# Patient Record
Sex: FEMALE | Race: WHITE | NOT HISPANIC OR LATINO | Employment: OTHER | URBAN - METROPOLITAN AREA
[De-identification: names, ages, dates, MRNs, and addresses within clinical notes are randomized per-mention and may not be internally consistent; named-entity substitution may affect disease eponyms.]

---

## 2017-01-08 ENCOUNTER — APPOINTMENT (OUTPATIENT)
Dept: RADIOLOGY | Facility: MEDICAL CENTER | Age: 72
End: 2017-01-08
Attending: EMERGENCY MEDICINE
Payer: MEDICARE

## 2017-01-08 ENCOUNTER — HOSPITAL ENCOUNTER (EMERGENCY)
Facility: MEDICAL CENTER | Age: 72
End: 2017-01-08
Attending: EMERGENCY MEDICINE
Payer: MEDICARE

## 2017-01-08 VITALS
BODY MASS INDEX: 19.17 KG/M2 | RESPIRATION RATE: 16 BRPM | TEMPERATURE: 97.7 F | WEIGHT: 122.14 LBS | OXYGEN SATURATION: 96 % | HEIGHT: 67 IN | HEART RATE: 79 BPM | SYSTOLIC BLOOD PRESSURE: 148 MMHG | DIASTOLIC BLOOD PRESSURE: 76 MMHG

## 2017-01-08 DIAGNOSIS — R42 VERTIGO: ICD-10-CM

## 2017-01-08 DIAGNOSIS — R42 DIZZINESS: ICD-10-CM

## 2017-01-08 DIAGNOSIS — I72.5 BASILAR ARTERY ANEURYSM (HCC): ICD-10-CM

## 2017-01-08 LAB
ALBUMIN SERPL BCP-MCNC: 4.3 G/DL (ref 3.2–4.9)
ALBUMIN/GLOB SERPL: 1.9 G/DL
ALP SERPL-CCNC: 58 U/L (ref 30–99)
ALT SERPL-CCNC: 15 U/L (ref 2–50)
ANION GAP SERPL CALC-SCNC: 7 MMOL/L (ref 0–11.9)
APPEARANCE UR: CLEAR
AST SERPL-CCNC: 19 U/L (ref 12–45)
BASOPHILS # BLD AUTO: 1.6 % (ref 0–1.8)
BASOPHILS # BLD: 0.07 K/UL (ref 0–0.12)
BILIRUB SERPL-MCNC: 1.1 MG/DL (ref 0.1–1.5)
BILIRUB UR QL STRIP.AUTO: NEGATIVE
BUN SERPL-MCNC: 8 MG/DL (ref 8–22)
CALCIUM SERPL-MCNC: 9.3 MG/DL (ref 8.4–10.2)
CHLORIDE SERPL-SCNC: 106 MMOL/L (ref 96–112)
CO2 SERPL-SCNC: 27 MMOL/L (ref 20–33)
COLOR UR: YELLOW
CREAT SERPL-MCNC: 0.73 MG/DL (ref 0.5–1.4)
EOSINOPHIL # BLD AUTO: 0.1 K/UL (ref 0–0.51)
EOSINOPHIL NFR BLD: 2.3 % (ref 0–6.9)
ERYTHROCYTE [DISTWIDTH] IN BLOOD BY AUTOMATED COUNT: 42.5 FL (ref 35.9–50)
GFR SERPL CREATININE-BSD FRML MDRD: >60 ML/MIN/1.73 M 2
GLOBULIN SER CALC-MCNC: 2.3 G/DL (ref 1.9–3.5)
GLUCOSE SERPL-MCNC: 105 MG/DL (ref 65–99)
GLUCOSE UR STRIP.AUTO-MCNC: NEGATIVE MG/DL
HCT VFR BLD AUTO: 43.9 % (ref 37–47)
HGB BLD-MCNC: 14.6 G/DL (ref 12–16)
IMM GRANULOCYTES # BLD AUTO: 0.01 K/UL (ref 0–0.11)
IMM GRANULOCYTES NFR BLD AUTO: 0.2 % (ref 0–0.9)
KETONES UR STRIP.AUTO-MCNC: NEGATIVE MG/DL
LEUKOCYTE ESTERASE UR QL STRIP.AUTO: NEGATIVE
LYMPHOCYTES # BLD AUTO: 1.47 K/UL (ref 1–4.8)
LYMPHOCYTES NFR BLD: 33.5 % (ref 22–41)
MAGNESIUM SERPL-MCNC: 2.2 MG/DL (ref 1.5–2.5)
MCH RBC QN AUTO: 30.7 PG (ref 27–33)
MCHC RBC AUTO-ENTMCNC: 33.3 G/DL (ref 33.6–35)
MCV RBC AUTO: 92.4 FL (ref 81.4–97.8)
MICRO URNS: NORMAL
MONOCYTES # BLD AUTO: 0.53 K/UL (ref 0–0.85)
MONOCYTES NFR BLD AUTO: 12.1 % (ref 0–13.4)
NEUTROPHILS # BLD AUTO: 2.21 K/UL (ref 2–7.15)
NEUTROPHILS NFR BLD: 50.3 % (ref 44–72)
NITRITE UR QL STRIP.AUTO: NEGATIVE
NRBC # BLD AUTO: 0 K/UL
NRBC BLD AUTO-RTO: 0 /100 WBC
PH UR STRIP.AUTO: 6.5 [PH]
PHOSPHATE SERPL-MCNC: 3.7 MG/DL (ref 2.5–4.5)
PLATELET # BLD AUTO: 266 K/UL (ref 164–446)
PMV BLD AUTO: 8.7 FL (ref 9–12.9)
POTASSIUM SERPL-SCNC: 4.2 MMOL/L (ref 3.6–5.5)
PROT SERPL-MCNC: 6.6 G/DL (ref 6–8.2)
PROT UR QL STRIP: NEGATIVE MG/DL
RBC # BLD AUTO: 4.75 M/UL (ref 4.2–5.4)
RBC UR QL AUTO: NEGATIVE
SODIUM SERPL-SCNC: 140 MMOL/L (ref 135–145)
SP GR UR STRIP.AUTO: <=1.005
WBC # BLD AUTO: 4.4 K/UL (ref 4.8–10.8)

## 2017-01-08 PROCEDURE — 70498 CT ANGIOGRAPHY NECK: CPT

## 2017-01-08 PROCEDURE — 93005 ELECTROCARDIOGRAM TRACING: CPT | Performed by: EMERGENCY MEDICINE

## 2017-01-08 PROCEDURE — 99284 EMERGENCY DEPT VISIT MOD MDM: CPT

## 2017-01-08 PROCEDURE — 700117 HCHG RX CONTRAST REV CODE 255: Performed by: EMERGENCY MEDICINE

## 2017-01-08 PROCEDURE — 85025 COMPLETE CBC W/AUTO DIFF WBC: CPT

## 2017-01-08 PROCEDURE — 84100 ASSAY OF PHOSPHORUS: CPT

## 2017-01-08 PROCEDURE — 36415 COLL VENOUS BLD VENIPUNCTURE: CPT

## 2017-01-08 PROCEDURE — 70496 CT ANGIOGRAPHY HEAD: CPT

## 2017-01-08 PROCEDURE — 83735 ASSAY OF MAGNESIUM: CPT

## 2017-01-08 PROCEDURE — 81003 URINALYSIS AUTO W/O SCOPE: CPT

## 2017-01-08 PROCEDURE — 700105 HCHG RX REV CODE 258: Performed by: EMERGENCY MEDICINE

## 2017-01-08 PROCEDURE — 80053 COMPREHEN METABOLIC PANEL: CPT

## 2017-01-08 RX ORDER — CEFDINIR 300 MG/1
300 CAPSULE ORAL 2 TIMES DAILY
Qty: 10 CAP | Refills: 0 | Status: SHIPPED | OUTPATIENT
Start: 2017-01-08 | End: 2017-01-13

## 2017-01-08 RX ORDER — SODIUM CHLORIDE 9 MG/ML
1000 INJECTION, SOLUTION INTRAVENOUS ONCE
Status: COMPLETED | OUTPATIENT
Start: 2017-01-08 | End: 2017-01-08

## 2017-01-08 RX ORDER — MECLIZINE HYDROCHLORIDE 25 MG/1
25 TABLET ORAL 3 TIMES DAILY PRN
Qty: 15 TAB | Refills: 0 | Status: SHIPPED | OUTPATIENT
Start: 2017-01-08 | End: 2018-02-22

## 2017-01-08 RX ORDER — MECOBALAMIN 5000 MCG
15 TABLET,DISINTEGRATING ORAL DAILY
COMMUNITY

## 2017-01-08 RX ADMIN — SODIUM CHLORIDE 1000 ML: 9 INJECTION, SOLUTION INTRAVENOUS at 09:49

## 2017-01-08 RX ADMIN — IOHEXOL 100 ML: 350 INJECTION, SOLUTION INTRAVENOUS at 10:53

## 2017-01-08 ASSESSMENT — PAIN SCALES - GENERAL: PAINLEVEL_OUTOF10: 2

## 2017-01-08 ASSESSMENT — PAIN SCALES - WONG BAKER: WONGBAKER_NUMERICALRESPONSE: HURTS JUST A LITTLE BIT

## 2017-01-08 NOTE — ED AVS SNAPSHOT
1/8/2017          Kim Lora  [address]  [place] [address]    Dear Kim:    [facility] wants to ensure your discharge home is safe and you or your loved ones have had all your questions answered regarding your care after you leave the hospital.    You may receive a telephone call within two days of your discharge.  This call is to make certain you understand your discharge instructions as well as ensure we provided you with the best care possible during your stay with us.     The call will only last approximately 3-5 minutes and will be done by a nurse.    Once again, we want to ensure your discharge home is safe and that you have a clear understanding of any next steps in your care.  If you have any questions or concerns, please do not hesitate to contact us, we are here for you.  Thank you for choosing [facility] for your healthcare needs.    Sincerely,    Dewayne Painter    [facility]

## 2017-01-08 NOTE — ED PROVIDER NOTES
ED Provider Note    CHIEF COMPLAINT  Chief Complaint   Patient presents with   • Ear Pain     srini Lora is a 71 y.o. female who presents to the emergency department complaining of right ear pain and severe dizziness. She states that she started with right ear popping that started 5 days ago. Following that she had severe dizziness with the room slightly moving. She states the dizziness is worse in the morning when she wakes up and when she feels a popping in her ear. She denies headaches, vision changes, hearing changes, fever, upper rest or tract infection-like symptoms. Denies any loss of sensation or strength to her arms or legs, abdominal pain, back pain, chest pain, palpitations    REVIEW OF SYSTEMS  Positives as above. Pertinent negatives include recent surgery, fever, sore throat, palpitations, upper respiratory tract infection, nausea, vomiting  All other review of systems are negative    PAST MEDICAL HISTORY  Denies  FAMILY HISTORY  Noncontributory    SOCIAL HISTORY  Social History     Social History   • Marital Status:      Spouse Name: N/A   • Number of Children: N/A   • Years of Education: N/A     Social History Main Topics   • Smoking status: None   • Smokeless tobacco: None   • Alcohol Use: None   • Drug Use: None   • Sexual Activity: Not Asked     Other Topics Concern   • None     Social History Narrative       SURGICAL HISTORY  Past Surgical History   Procedure Laterality Date   • Us-needle core bx-breast panel         CURRENT MEDICATIONS  Home Medications     Reviewed by Umesh Siddiqui (Pharmacy Tech) on 01/08/17 at 0837  Med List Status: Complete    Medication Last Dose Status    lansoprazole (PREVACID) 15 MG CAPSULE DELAYED RELEASE 1/8/2017 Active                ALLERGIES  Allergies   Allergen Reactions   • Clarithromycin Swelling     Facial swelling   • Penicillin V Swelling     Facial Swelling   • Phenolphthalein Unspecified     Per patient, reaction as a child.  "      PHYSICAL EXAM  VITAL SIGNS: /76 mmHg  Pulse 79  Temp(Src) 36.5 °C (97.7 °F)  Resp 16  Ht 1.702 m (5' 7\")  Wt 55.4 kg (122 lb 2.2 oz)  BMI 19.12 kg/m2  SpO2 96%     Constitutional: Well developed, Well nourished, No acute distress, Non-toxic appearance.   Eyes: PERRLA, EOMI, Conjunctiva normal, No discharge.   HENT: Retracted right tympanic membrane, slightly erythematous, left tympanic membrane is normal, no pharyngeal erythema or edema  Cardiovascular: Normal heart rate, Normal rhythm, No murmurs, No rubs, No gallops, and intact distal pulses.   Thorax & Lungs:  No respiratory distress, no rales, no rhonchi, No wheezing, No chest wall tenderness.   Abdomen: Bowel sounds normal, Soft, No tenderness, No guarding, No rebound, No pulsatile masses.   Skin: Warm, Dry, No erythema, No rash.   Extremities: Full range of motion, no deformity, no edema.  Neurologic:  Alert & oriented to month and age, Normal cognition, Cranial nerves II-XII are intact, No slurred speech, Negative finger to nose bilaterally, No pronator drift bilaterally,   strength 5/5 bilaterally, Leg raise strength 5/5 bilaterally, Plantarflexion strength 5/5 bilaterally, Dorsiflexion strength 5/5 bilaterally, Deep tendon reflexes 2/4 upper and lower extremities bilaterally, Sensation intact throughout, No Nystagmus. Positive Shasta-Hallpike maneuver to the right  Psychiatric: Affect normal for clinical presentation.    EKG  EKG Interpretation    Interpreted by me    Rhythm: normal sinus   Rate: 64  Axis: normal  Ectopy: none  Conduction: RSR' V1 and V2, low voltage throughout  ST Segments: no acute change  T Waves: no acute change  Q Waves: none  No old EKGs for comparison    Clinical Impression: no acute changes and normal EKG  RADIOLOGY/PROCEDURES  CT-CTA NECK WITH & W/O-POST PROCESSING   Final Result      1.  Mild, less than 50%, atherosclerotic narrowing of the carotid bulbs and proximal internal carotid arteries bilaterally, " slightly more apparent on the LEFT.   2.  No evidence for focal high-grade stenosis, dissection or occlusion of the cervical carotid or vertebral arteries.      CT-CTA HEAD WITH & W/O-POST PROCESS   Final Result      1.  Small caliber basilar artery.  Prominence of the distal basilar artery concerning for 2 mm aneurysm.   2.  No abrupt vessel cut off to indicate occlusion.   3.  Persistent carotid origin of the posterior cerebral arteries, normal variant.        Results for orders placed or performed during the hospital encounter of 01/08/17   CBC WITH DIFFERENTIAL   Result Value Ref Range    WBC 4.4 (L) 4.8 - 10.8 K/uL    RBC 4.75 4.20 - 5.40 M/uL    Hemoglobin 14.6 12.0 - 16.0 g/dL    Hematocrit 43.9 37.0 - 47.0 %    MCV 92.4 81.4 - 97.8 fL    MCH 30.7 27.0 - 33.0 pg    MCHC 33.3 (L) 33.6 - 35.0 g/dL    RDW 42.5 35.9 - 50.0 fL    Platelet Count 266 164 - 446 K/uL    MPV 8.7 (L) 9.0 - 12.9 fL    Neutrophils-Polys 50.30 44.00 - 72.00 %    Lymphocytes 33.50 22.00 - 41.00 %    Monocytes 12.10 0.00 - 13.40 %    Eosinophils 2.30 0.00 - 6.90 %    Basophils 1.60 0.00 - 1.80 %    Immature Granulocytes 0.20 0.00 - 0.90 %    Nucleated RBC 0.00 /100 WBC    Neutrophils (Absolute) 2.21 2.00 - 7.15 K/uL    Lymphs (Absolute) 1.47 1.00 - 4.80 K/uL    Monos (Absolute) 0.53 0.00 - 0.85 K/uL    Eos (Absolute) 0.10 0.00 - 0.51 K/uL    Baso (Absolute) 0.07 0.00 - 0.12 K/uL    Immature Granulocytes (abs) 0.01 0.00 - 0.11 K/uL    NRBC (Absolute) 0.00 K/uL   COMP METABOLIC PANEL   Result Value Ref Range    Sodium 140 135 - 145 mmol/L    Potassium 4.2 3.6 - 5.5 mmol/L    Chloride 106 96 - 112 mmol/L    Co2 27 20 - 33 mmol/L    Anion Gap 7.0 0.0 - 11.9    Glucose 105 (H) 65 - 99 mg/dL    Bun 8 8 - 22 mg/dL    Creatinine 0.73 0.50 - 1.40 mg/dL    Calcium 9.3 8.4 - 10.2 mg/dL    AST(SGOT) 19 12 - 45 U/L    ALT(SGPT) 15 2 - 50 U/L    Alkaline Phosphatase 58 30 - 99 U/L    Total Bilirubin 1.1 0.1 - 1.5 mg/dL    Albumin 4.3 3.2 - 4.9 g/dL     Total Protein 6.6 6.0 - 8.2 g/dL    Globulin 2.3 1.9 - 3.5 g/dL    A-G Ratio 1.9 g/dL   MAGNESIUM   Result Value Ref Range    Magnesium 2.2 1.5 - 2.5 mg/dL   PHOSPHORUS   Result Value Ref Range    Phosphorus 3.7 2.5 - 4.5 mg/dL   URINALYSIS   Result Value Ref Range    Color Yellow     Character Clear     Specific Gravity <=1.005 <1.035    Ph 6.5 5.0-8.0    Glucose Negative Negative mg/dL    Ketones Negative Negative mg/dL    Protein Negative Negative mg/dL    Bilirubin Negative Negative    Nitrite Negative Negative    Leukocyte Esterase Negative Negative    Occult Blood Negative Negative    Micro Urine Req see below    ESTIMATED GFR   Result Value Ref Range    GFR If African American >60 >60 mL/min/1.73 m 2    GFR If Non African American >60 >60 mL/min/1.73 m 2   EKG (ER)   Result Value Ref Range    Report       [facility] Emergency Dept.    Test Date:  2017  Pt Name:    CHERYL ZIEGLER               Department: [facility]  MRN:        [id number]                      Room:       [place] [address]  Gender:     F                            Technician: [id number]  :        [id number]                   Requested By:BILL ARCE  Order #:    [id number]                    Reading MD:    Measurements  Intervals                                Axis  Rate:       64                           P:          65  FL:         174                          QRS:        -74  QRSD:       96                           T:          34  QT:         431  QTc:        445    Interpretive Statements  Sinus rhythm  Probable left atrial enlargement  Left anterior fascicular block  Borderline low voltage, extremity leads  RSR' in V1 or V2, right VCD or RVH  Nonspecific T abnormalities, anterior leads  No previous ECG available for comparison         COURSE & MEDICAL DECISION MAKING  Pertinent Labs & Imaging studies reviewed. (See chart for details)  This is a charming 71-year-old female comes in with intermittent dizziness and  vertigo like symptoms. She does have positive Shasta-Hallpike maneuver. The patient did state she felt a popping sensation near followed by the vertigo. The patient may have an early infection as concern for possible vascular etiology and posterior cervical motion abnormality. CTA head and neck was completed and revealed no evidence of intracranial hemorrhage, no evidence of significant aneurysm except for a small 2 mm possible aneurysm in the distal basilar artery. The patient has less than 50% stenosis bilaterally carotid arteries as well. Patient has no focal neurological deficits. In the emergency department, she had no focal neurological deficits, she is [place] without difficulty, she states she feels much better and is asymptomatic. I do not believe the patient is experiencing TIA, CVA and does have reproducible vertigo-like symptoms with Shasta-Hallpike maneuver. The patient does not have evidence of infection, no evidence of significant neck abnormality, no evidence of palpitations or cardiac arrhythmia. The patient will be discharged with meclizine and Ceftin here with strict return precautions and be following up with ENT for further evaluation. She is to return to the emergency department she has increasing symptomatology.     Discharge Medication List as of 1/8/2017 12:11 PM      START taking these medications    Details   meclizine (ANTIVERT) 25 MG Tab Take 1 Tab by mouth 3 times a day as needed (vertigo). No driving, no drinking alcohol., Disp-15 Tab, R-0, Print Rx Paper      cefdinir (OMNICEF) 300 MG Cap Take 1 Cap by mouth 2 times a day for 5 days., Disp-10 Cap, R-0, Print Rx Paper             FINAL IMPRESSION     1. Dizziness    2. Vertigo    3. Basilar artery aneurysm       The patient will return for new or worsening symptoms and is stable at the time of discharge.    The patient is referred to a primary physician for blood pressure management, diabetic screening, and for all other preventative health  concerns.    DISPOSITION:  Patient will be discharged home in stable condition.    FOLLOW UP:  [facility]  [address] [phone]  Schedule an appointment as soon as possible for a visit  As needed    Gomez Kebede M.D.  [address]  [address]  [place] [address]  [phone]    Schedule an appointment as soon as possible for a visit in 3 days      Charmaine Rock M.D.  [address]  [place] [address]  [phone]    Schedule an appointment as soon as possible for a visit  As needed      OUTPATIENT MEDICATIONS:  Discharge Medication List as of 1/8/2017 12:11 PM      START taking these medications    Details   meclizine (ANTIVERT) 25 MG Tab Take 1 Tab by mouth 3 times a day as needed (vertigo). No driving, no drinking alcohol., Disp-15 Tab, R-0, Print Rx Paper      cefdinir (OMNICEF) 300 MG Cap Take 1 Cap by mouth 2 times a day for 5 days., Disp-10 Cap, R-0, Print Rx Paper                    Electronically signed by: Keegan Beck, 1/8/2017 8:37 AM

## 2017-01-08 NOTE — ED NOTES
Pt received d/c instr; pt verbalized understanding. Pt amb to lobby, pt's spouse to drive her home.

## 2017-01-08 NOTE — ED NOTES
Med rec complete per patient.  No antibiotics within last month.  Allergies reviewed with patient and updated per patient request.

## 2017-01-08 NOTE — DISCHARGE INSTRUCTIONS
Please follow-up with neurosurgery for your aneurysm.    Benign Positional Vertigo  Vertigo means you feel like you or your surroundings are moving when they are not. Benign positional vertigo is the most common form of vertigo. Benign means that the cause of your condition is not serious. Benign positional vertigo is more common in older adults.  CAUSES   Benign positional vertigo is the result of an upset in the labyrinth system. This is an area in the middle ear that helps control your balance. This may be caused by a viral infection, head injury, or repetitive motion. However, often no specific cause is found.  SYMPTOMS   Symptoms of benign positional vertigo occur when you move your head or eyes in different directions. Some of the symptoms may include:  · Loss of balance and falls.  · Vomiting.  · Blurred vision.  · Dizziness.  · Nausea.  · Involuntary eye movements (nystagmus).  DIAGNOSIS   Benign positional vertigo is usually diagnosed by physical exam. If the specific cause of your benign positional vertigo is unknown, your caregiver may perform imaging tests, such as magnetic resonance imaging (MRI) or computed tomography (CT).  TREATMENT   Your caregiver may recommend movements or procedures to correct the benign positional vertigo. Medicines such as meclizine, benzodiazepines, and medicines for nausea may be used to treat your symptoms. In rare cases, if your symptoms are caused by certain conditions that affect the inner ear, you may need surgery.  HOME CARE INSTRUCTIONS   · Follow your caregiver's instructions.  · Move slowly. Do not make sudden body or head movements.  · Avoid driving.  · Avoid operating heavy machinery.  · Avoid performing any tasks that would be dangerous to you or others during a vertigo episode.  · Drink enough fluids to keep your urine clear or pale yellow.  SEEK IMMEDIATE MEDICAL CARE IF:   · You develop problems with walking, weakness, numbness, or using your arms, hands, or  legs.  · You have difficulty speaking.  · You develop severe headaches.  · Your nausea or vomiting continues or gets worse.  · You develop visual changes.  · Your family or friends notice any behavioral changes.  · Your condition gets worse.  · You have a fever.  · You develop a stiff neck or sensitivity to light.  MAKE SURE YOU:   · Understand these instructions.  · Will watch your condition.  · Will get help right away if you are not doing well or get worse.     This information is not intended to replace advice given to you by your health care provider. Make sure you discuss any questions you have with your health care provider.     Document Released: 09/25/2007 Document Revised: 03/11/2013 Document Reviewed: 04/11/2016  [employer] Interactive Patient Education ©2016 [employer]      Dizziness  Dizziness is a common problem. It is a feeling of unsteadiness or light-headedness. You may feel like you are about to faint. Dizziness can lead to injury if you stumble or fall. Anyone can become dizzy, but dizziness is more common in older adults. This condition can be caused by a number of things, including medicines, dehydration, or illness.  HOME CARE INSTRUCTIONS  Taking these steps may help with your condition:  Eating and Drinking  · Drink enough fluid to keep your urine clear or pale yellow. This helps to keep you from becoming dehydrated. Try to drink more clear fluids, such as water.  · Do not drink alcohol.  · Limit your caffeine intake if directed by your health care provider.  · Limit your salt intake if directed by your health care provider.  Activity  · Avoid making quick movements.  ¨ Rise slowly from chairs and steady yourself until you feel okay.  ¨ In the morning, first sit up on the side of the bed. When you feel okay, stand slowly while you hold onto something until you know that your balance is fine.  · Move your legs often if you need to  one place for a long time. Tighten and relax your  muscles in your legs while you are standing.  · Do not drive or operate heavy machinery if you feel dizzy.  · Avoid bending down if you feel dizzy. Place items in your home so that they are easy for you to reach without leaning over.  Lifestyle  · Do not use any tobacco products, including cigarettes, chewing tobacco, or electronic cigarettes. If you need help quitting, ask your health care provider.  · Try to reduce your stress level, such as with yoga or meditation. Talk with your health care provider if you need help.  General Instructions  · Watch your dizziness for any changes.  · Take medicines only as directed by your health care provider. Talk with your health care provider if you think that your dizziness is caused by a medicine that you are taking.  · Tell a friend or a family member that you are feeling dizzy. If he or she notices any changes in your behavior, have this person call your health care provider.  · Keep all follow-up visits as directed by your health care provider. This is important.  SEEK MEDICAL CARE IF:  · Your dizziness does not go away.  · Your dizziness or light-headedness gets worse.  · You feel nauseous.  · You have reduced hearing.  · You have new symptoms.  · You are unsteady on your feet or you feel like the room is spinning.  SEEK IMMEDIATE MEDICAL CARE IF:  · You vomit or have diarrhea and are unable to eat or drink anything.  · You have problems talking, walking, swallowing, or using your arms, hands, or legs.  · You feel generally weak.  · You are not thinking clearly or you have trouble forming sentences. It may take a friend or family member to notice this.  · You have chest pain, abdominal pain, shortness of breath, or sweating.  · Your vision changes.  · You notice any bleeding.  · You have a headache.  · You have neck pain or a stiff neck.  · You have a fever.     This information is not intended to replace advice given to you by your health care provider. Make sure you  discuss any questions you have with your health care provider.     Document Released: 06/13/2002 Document Revised: 05/03/2016 Document Reviewed: 12/14/2015  [employer] Interactive Patient Education ©2016 [employer]

## 2017-01-08 NOTE — ED AVS SNAPSHOT
After Visit Summary                                                                                                                Kim Lora   MRN: [id number]    Department:  [facility], Emergency Dept   Date of Visit:  1/8/2017            [facility], Emergency Dept    [address]    [place] [address]    Phone:  [phone]      You were seen by     Keegan Beck D.O.      Your Diagnosis Was     Dizziness     R42       These are the medications you received during your hospitalization from 01/08/2017 0813 to 01/08/2017 1211     Date/Time Order Dose Route Action    01/08/2017 0949 NS infusion 1,000 mL 1,000 mL Intravenous New Bag    01/08/2017 1053 iohexol (OMNIPAQUE) 350 mg/mL 100 mL Intravenous Given      Follow-up Information     1. Schedule an appointment as soon as possible for a visit with [facility].    Why:  As needed    Contact information    [address]  [place] [phone]        2. Follow up with Gomez Kebede M.D.. Schedule an appointment as soon as possible for a visit in 3 days.    Specialty:  Family Medicine    Contact information    [address]  [place] [address]  [phone]          3. Schedule an appointment as soon as possible for a visit with Charmaine Rock M.D..    Specialty:  Otolaryngology    Why:  As needed    Contact information    [address] [id number] [phone]        Medication Information     Review all of your home medications and newly ordered medications with your primary doctor and/or pharmacist as soon as possible. Follow medication instructions as directed by your doctor and/or pharmacist.     Please keep your complete medication list with you and share with your physician. Update the information when medications are discontinued, doses are changed, or new medications (including over-the-counter products) are added; and carry medication  information at all times in the event of emergency situations.               Medication List      START taking these medications        Instructions    cefdinir 300 MG Caps   Commonly known as:  OMNICEF    Take 1 Cap by mouth 2 times a day for 5 days.   Dose:  300 mg       meclizine 25 MG Tabs   Commonly known as:  ANTIVERT    Take 1 Tab by mouth 3 times a day as needed (vertigo). No driving, no drinking alcohol.   Dose:  25 mg         ASK your doctor about these medications        Instructions    lansoprazole 15 MG Cpdr   Commonly known as:  PREVACID    Take 15 mg by mouth every day.   Dose:  15 mg               Procedures and tests performed during your visit     CBC WITH DIFFERENTIAL    COMP METABOLIC PANEL    CONSENT FOR CONTRAST INJECTION    CT-CTA HEAD WITH & W/O-POST PROCESS    CT-CTA NECK WITH & W/O-POST PROCESSING    EKG (ER)    ESTIMATED GFR    MAGNESIUM    PHOSPHORUS    URINALYSIS        Discharge Instructions       Please follow-up with neurosurgery for your aneurysm.    Benign Positional Vertigo  Vertigo means you feel like you or your surroundings are moving when they are not. Benign positional vertigo is the most common form of vertigo. Benign means that the cause of your condition is not serious. Benign positional vertigo is more common in older adults.  CAUSES   Benign positional vertigo is the result of an upset in the labyrinth system. This is an area in the middle ear that helps control your balance. This may be caused by a viral infection, head injury, or repetitive motion. However, often no specific cause is found.  SYMPTOMS   Symptoms of benign positional vertigo occur when you move your head or eyes in different directions. Some of the symptoms may include:  · Loss of balance and falls.  · Vomiting.  · Blurred vision.  · Dizziness.  · Nausea.  · Involuntary eye movements (nystagmus).  DIAGNOSIS   Benign positional vertigo is usually diagnosed by physical exam. If the specific cause of your  benign positional vertigo is unknown, your caregiver may perform imaging tests, such as magnetic resonance imaging (MRI) or computed tomography (CT).  TREATMENT   Your caregiver may recommend movements or procedures to correct the benign positional vertigo. Medicines such as meclizine, benzodiazepines, and medicines for nausea may be used to treat your symptoms. In rare cases, if your symptoms are caused by certain conditions that affect the inner ear, you may need surgery.  HOME CARE INSTRUCTIONS   · Follow your caregiver's instructions.  · Move slowly. Do not make sudden body or head movements.  · Avoid driving.  · Avoid operating heavy machinery.  · Avoid performing any tasks that would be dangerous to you or others during a vertigo episode.  · Drink enough fluids to keep your urine clear or pale yellow.  SEEK IMMEDIATE MEDICAL CARE IF:   · You develop problems with walking, weakness, numbness, or using your arms, hands, or legs.  · You have difficulty speaking.  · You develop severe headaches.  · Your nausea or vomiting continues or gets worse.  · You develop visual changes.  · Your family or friends notice any behavioral changes.  · Your condition gets worse.  · You have a fever.  · You develop a stiff neck or sensitivity to light.  MAKE SURE YOU:   · Understand these instructions.  · Will watch your condition.  · Will get help right away if you are not doing well or get worse.     This information is not intended to replace advice given to you by your health care provider. Make sure you discuss any questions you have with your health care provider.     Document Released: 09/25/2007 Document Revised: 03/11/2013 Document Reviewed: 04/11/2016  [employer] Interactive Patient Education ©2016 [employer]      Dizziness  Dizziness is a common problem. It is a feeling of unsteadiness or light-headedness. You may feel like you are about to faint. Dizziness can lead to injury if you stumble or fall. Anyone can become  dizzy, but dizziness is more common in older adults. This condition can be caused by a number of things, including medicines, dehydration, or illness.  HOME CARE INSTRUCTIONS  Taking these steps may help with your condition:  Eating and Drinking  · Drink enough fluid to keep your urine clear or pale yellow. This helps to keep you from becoming dehydrated. Try to drink more clear fluids, such as water.  · Do not drink alcohol.  · Limit your caffeine intake if directed by your health care provider.  · Limit your salt intake if directed by your health care provider.  Activity  · Avoid making quick movements.  ¨ Rise slowly from chairs and steady yourself until you feel okay.  ¨ In the morning, first sit up on the side of the bed. When you feel okay, stand slowly while you hold onto something until you know that your balance is fine.  · Move your legs often if you need to  one place for a long time. Tighten and relax your muscles in your legs while you are standing.  · Do not drive or operate heavy machinery if you feel dizzy.  · Avoid bending down if you feel dizzy. Place items in your home so that they are easy for you to reach without leaning over.  Lifestyle  · Do not use any tobacco products, including cigarettes, chewing tobacco, or electronic cigarettes. If you need help quitting, ask your health care provider.  · Try to reduce your stress level, such as with yoga or meditation. Talk with your health care provider if you need help.  General Instructions  · Watch your dizziness for any changes.  · Take medicines only as directed by your health care provider. Talk with your health care provider if you think that your dizziness is caused by a medicine that you are taking.  · Tell a friend or a family member that you are feeling dizzy. If he or she notices any changes in your behavior, have this person call your health care provider.  · Keep all follow-up visits as directed by your health care provider. This  is important.  SEEK MEDICAL CARE IF:  · Your dizziness does not go away.  · Your dizziness or light-headedness gets worse.  · You feel nauseous.  · You have reduced hearing.  · You have new symptoms.  · You are unsteady on your feet or you feel like the room is spinning.  SEEK IMMEDIATE MEDICAL CARE IF:  · You vomit or have diarrhea and are unable to eat or drink anything.  · You have problems talking, walking, swallowing, or using your arms, hands, or legs.  · You feel generally weak.  · You are not thinking clearly or you have trouble forming sentences. It may take a friend or family member to notice this.  · You have chest pain, abdominal pain, shortness of breath, or sweating.  · Your vision changes.  · You notice any bleeding.  · You have a headache.  · You have neck pain or a stiff neck.  · You have a fever.     This information is not intended to replace advice given to you by your health care provider. Make sure you discuss any questions you have with your health care provider.     Document Released: 06/13/2002 Document Revised: 05/03/2016 Document Reviewed: 12/14/2015  [employer] Interactive Patient Education ©2016 [employer]              Patient Information     Patient Information    Following emergency treatment: all patient requiring follow-up care must return either to a private physician or a clinic if your condition worsens before you are able to obtain further medical attention, please return to the emergency room.     Billing Information    At [facility], we work to make the billing process streamlined for our patients.  Our Representatives are here to answer any questions you may have regarding your hospital bill.  If you have insurance coverage and have supplied your insurance information to us, we will submit a claim to your insurer on your behalf.  Should you have any questions regarding your bill, we can be reached online or by phone as follows:  Online: You are able pay your bills online  or live chat with our representatives about any billing questions you may have. We are here to help Monday - Friday from 8:00am to 7:30pm and 9:00am - 12:00pm on Saturdays.  Please visit [email or web address]  for more information.   Phone:  [phone] or [phone]    Please note that your emergency physician, surgeon, pathologist, radiologist, anesthesiologist, and other specialists are not employed by [facility] and will therefore bill separately for their services.  Please contact them directly for any questions concerning their bills at the numbers below:     Emergency Physician Services:  [phone]  [facility]:  [phone]  [facility]:  [phone]  [facility]:  [phone]    1. Your final bill may vary from the amount quoted upon discharge if all procedures are not complete at that time, or if your doctor has additional procedures of which we are not aware. You will receive an additional bill if you return to the Emergency Department at [facility] for suture removal regardless of the facility of which the sutures were placed.     2. Please arrange for settlement of this account at the emergency registration.    3. All self-pay accounts are due in full at the time of treatment.  If you are unable to meet this obligation then payment is expected within 4-5 days.     4. If you have had radiology studies (CT, X-ray, Ultrasound, MRI), you have received a preliminary result during your emergency department visit. Please contact the radiology department [phone] to receive a copy of your final result. Please discuss the Final result with your primary physician or with the follow up physician provided.     Crisis Hotline:  National Crisis Hotline:  9-638-OOWNJZT or 1-704.503.3838  [place] Crisis Hotline:    1-121.442.9182 or 964-280-9752         ED Discharge Follow Up Questions    1. In order to provide you with very  good care, we would like to follow up with a phone call in the next few days.  May we have your permission to contact you?     YES /  NO    2. What is the best phone number to call you? (       )_____-__________    3. What is the best time to call you?      Morning  /  Afternoon  /  Evening                   Patient Signature:  ____________________________________________________________    Date:  ____________________________________________________________

## 2017-01-10 ENCOUNTER — RX ONLY (OUTPATIENT)
Age: 72
Setting detail: RX ONLY
End: 2017-01-10

## 2017-01-10 PROBLEM — C44.311 BASAL CELL CARCINOMA OF SKIN OF NOSE: Status: ACTIVE | Noted: 2017-01-10

## 2017-01-12 LAB — EKG IMPRESSION: NORMAL

## 2017-01-24 PROBLEM — D49.2 NEOPLASM OF UNSPECIFIED BEHAVIOR OF BONE, SOFT TISSUE, AND SKIN: Status: RESOLVED | Noted: 2017-01-10 | Resolved: 2017-01-24

## 2017-03-21 ENCOUNTER — HOSPITAL ENCOUNTER (OUTPATIENT)
Dept: RADIOLOGY | Facility: MEDICAL CENTER | Age: 72
End: 2017-03-21
Attending: OBSTETRICS & GYNECOLOGY
Payer: MEDICARE

## 2017-03-21 DIAGNOSIS — Z13.9 SCREENING: ICD-10-CM

## 2017-03-21 PROCEDURE — 77063 BREAST TOMOSYNTHESIS BI: CPT

## 2018-01-25 ENCOUNTER — APPOINTMENT (OUTPATIENT)
Dept: RADIOLOGY | Facility: MEDICAL CENTER | Age: 73
End: 2018-01-25
Attending: NEUROLOGICAL SURGERY
Payer: MEDICARE

## 2018-01-27 ENCOUNTER — OFFICE VISIT (OUTPATIENT)
Dept: URGENT CARE | Facility: CLINIC | Age: 73
End: 2018-01-27
Payer: MEDICARE

## 2018-01-27 VITALS
SYSTOLIC BLOOD PRESSURE: 100 MMHG | HEIGHT: 66 IN | WEIGHT: 125 LBS | OXYGEN SATURATION: 97 % | DIASTOLIC BLOOD PRESSURE: 65 MMHG | RESPIRATION RATE: 20 BRPM | HEART RATE: 84 BPM | TEMPERATURE: 97.9 F | BODY MASS INDEX: 20.09 KG/M2

## 2018-01-27 DIAGNOSIS — T50.905A ADVERSE EFFECT OF DRUG, INITIAL ENCOUNTER: ICD-10-CM

## 2018-01-27 DIAGNOSIS — J06.9 VIRAL URI WITH COUGH: ICD-10-CM

## 2018-01-27 DIAGNOSIS — R11.0 NAUSEA: ICD-10-CM

## 2018-01-27 PROCEDURE — 99203 OFFICE O/P NEW LOW 30 MIN: CPT | Performed by: PHYSICIAN ASSISTANT

## 2018-01-27 RX ORDER — OSELTAMIVIR PHOSPHATE 75 MG/1
75 CAPSULE ORAL 2 TIMES DAILY
COMMUNITY
End: 2018-02-22

## 2018-01-27 RX ORDER — ONDANSETRON 4 MG/1
4 TABLET, FILM COATED ORAL EVERY 4 HOURS PRN
Qty: 20 TAB | Refills: 0 | Status: SHIPPED | OUTPATIENT
Start: 2018-01-27 | End: 2018-02-01

## 2018-01-27 ASSESSMENT — ENCOUNTER SYMPTOMS
MYALGIAS: 1
VOMITING: 1
BODY ACHES: 1
NAUSEA: 1
COUGH: 1
SHORTNESS OF BREATH: 0
DIARRHEA: 1
SORE THROAT: 0
DIZZINESS: 0
ABDOMINAL PAIN: 0
WHEEZING: 0
HEADACHES: 1
FEVER: 0
CARDIOVASCULAR NEGATIVE: 1
CHILLS: 1
SINUS PAIN: 1

## 2018-01-27 NOTE — PROGRESS NOTES
Subjective:      Kim Lora is a 72 y.o. female who presents with Generalized Body Aches (X 5 days bodyache , chills .)            Sinus Problem   This is a new problem. The current episode started in the past 7 days. The problem is unchanged. There has been no fever. The fever has been present for less than 1 day. Associated symptoms include chills, congestion, coughing and headaches. Pertinent negatives include no ear pain, shortness of breath or sore throat. Past treatments include oral decongestants (NSAIDS). The treatment provided mild relief.   Patient has been prescribed a Z-Pancho for a sinus problem and Tamiflu for presumed flulike illness. She states her sinuses are improved however shortly after starting Tamiflu she began having nausea vomiting diarrhea. Flulike symptoms have resolved.      PMH:  has no past medical history on file.  MEDS:   Current Outpatient Prescriptions:   •  oseltamivir (TAMIFLU) 75 MG Cap, Take 75 mg by mouth 2 times a day., Disp: , Rfl:   •  lansoprazole (PREVACID) 15 MG CAPSULE DELAYED RELEASE, Take 15 mg by mouth every day., Disp: , Rfl:   •  meclizine (ANTIVERT) 25 MG Tab, Take 1 Tab by mouth 3 times a day as needed (vertigo). No driving, no drinking alcohol., Disp: 15 Tab, Rfl: 0  ALLERGIES:   Allergies   Allergen Reactions   • Clarithromycin Swelling     Facial swelling   • Penicillin V Swelling     Facial Swelling   • Phenolphthalein Unspecified     Per patient, reaction as a child.     SURGHX:   Past Surgical History:   Procedure Laterality Date   • US-NEEDLE CORE BX-BREAST PANEL       SOCHX:    FH: family history includes Cancer in her mother.      Review of Systems   Constitutional: Positive for chills. Negative for fever.   HENT: Positive for congestion and sinus pain. Negative for ear pain and sore throat.    Respiratory: Positive for cough. Negative for shortness of breath and wheezing.    Cardiovascular: Negative.    Gastrointestinal: Positive for diarrhea, nausea and  "vomiting. Negative for abdominal pain.   Musculoskeletal: Positive for myalgias.   Neurological: Positive for headaches. Negative for dizziness.       Medications, Allergies, and current problem list reviewed today in Epic     Objective:     /65   Pulse 84   Temp 36.6 °C (97.9 °F)   Resp 20   Ht 1.676 m (5' 6\")   Wt 56.7 kg (125 lb)   SpO2 97%   BMI 20.18 kg/m²      Physical Exam   Constitutional: She is oriented to person, place, and time. She appears well-developed and well-nourished. No distress.   HENT:   Head: Normocephalic and atraumatic.   Right Ear: Tympanic membrane and external ear normal.   Left Ear: Tympanic membrane and external ear normal.   Nose: Nose normal.   Mouth/Throat: Oropharynx is clear and moist. No oropharyngeal exudate.   Eyes: Conjunctivae and EOM are normal. Pupils are equal, round, and reactive to light. Right eye exhibits no discharge. Left eye exhibits no discharge.   Neck: Normal range of motion. Neck supple.   Cardiovascular: Normal rate, regular rhythm and normal heart sounds.    Pulmonary/Chest: Effort normal and breath sounds normal. No respiratory distress. She has no wheezes. She has no rales.   Abdominal: Soft. She exhibits no distension. There is no tenderness.   Musculoskeletal: Normal range of motion.   Lymphadenopathy:     She has no cervical adenopathy.   Neurological: She is alert and oriented to person, place, and time.   Skin: Skin is warm and dry. She is not diaphoretic.   Psychiatric: She has a normal mood and affect. Her behavior is normal. Judgment and thought content normal.   Nursing note and vitals reviewed.              Assessment/Plan:     1. Viral URI with cough     2. Adverse effect of drug, initial encounter  ondansetron (ZOFRAN) 4 MG Tab tablet   3. Nausea  ondansetron (ZOFRAN) 4 MG Tab tablet     Nausea, vomiting, diarrhea likely a side effect of Tamiflu. She is instructed to stop that medication now. Otherwise vital signs normal, PO2 97%, " lungs clear.  Zofran for nausea  Finish Zithromax  OTC meds and conservative measures as discussed  Return to clinic or go to ED if symptoms worsen or persist. Indications for ED discussed at length. Patient voices understanding. Follow-up with your primary care provider in 3-5 days. Red flags discussed.    Please note that this dictation was created using voice recognition software. I have made every reasonable attempt to correct obvious errors, but I expect that there are errors of grammar and possibly content that I did not discover before finalizing the note.

## 2018-01-31 ENCOUNTER — HOSPITAL ENCOUNTER (OUTPATIENT)
Dept: RADIOLOGY | Facility: MEDICAL CENTER | Age: 73
End: 2018-01-31
Attending: NEUROLOGICAL SURGERY
Payer: MEDICARE

## 2018-01-31 DIAGNOSIS — I67.1 CEREBRAL ANEURYSM, NONRUPTURED: ICD-10-CM

## 2018-01-31 PROCEDURE — 70498 CT ANGIOGRAPHY NECK: CPT

## 2018-01-31 PROCEDURE — 70496 CT ANGIOGRAPHY HEAD: CPT

## 2018-01-31 PROCEDURE — 700117 HCHG RX CONTRAST REV CODE 255: Performed by: NEUROLOGICAL SURGERY

## 2018-01-31 RX ADMIN — IOHEXOL 20 ML: 350 INJECTION, SOLUTION INTRAVENOUS at 15:49

## 2018-01-31 RX ADMIN — IOHEXOL 100 ML: 350 INJECTION, SOLUTION INTRAVENOUS at 15:49

## 2018-02-16 ENCOUNTER — TELEPHONE (OUTPATIENT)
Dept: CARDIOLOGY | Facility: MEDICAL CENTER | Age: 73
End: 2018-02-16

## 2018-02-16 NOTE — TELEPHONE ENCOUNTER
I called the patient at [phone] and spoke with the patient directly regarding:  -I confirmed her appt with SW for 02/21/18 @ 0900am checkin.  -No other cardiac records to obtain.  -Patient is getting over the flu.

## 2018-02-21 ENCOUNTER — OFFICE VISIT (OUTPATIENT)
Dept: CARDIOLOGY | Facility: MEDICAL CENTER | Age: 73
End: 2018-02-21
Payer: MEDICARE

## 2018-02-21 VITALS
BODY MASS INDEX: 19.61 KG/M2 | OXYGEN SATURATION: 97 % | HEIGHT: 66 IN | RESPIRATION RATE: 12 BRPM | WEIGHT: 122 LBS | SYSTOLIC BLOOD PRESSURE: 122 MMHG | HEART RATE: 88 BPM | DIASTOLIC BLOOD PRESSURE: 70 MMHG

## 2018-02-21 DIAGNOSIS — R07.89 CHEST HEAVINESS: ICD-10-CM

## 2018-02-21 DIAGNOSIS — J11.1 FLU SYNDROME: ICD-10-CM

## 2018-02-21 DIAGNOSIS — R94.31 ABNORMAL EKG: ICD-10-CM

## 2018-02-21 LAB — EKG IMPRESSION: NORMAL

## 2018-02-21 PROCEDURE — 93000 ELECTROCARDIOGRAM COMPLETE: CPT | Performed by: INTERNAL MEDICINE

## 2018-02-21 PROCEDURE — 99204 OFFICE O/P NEW MOD 45 MIN: CPT | Performed by: INTERNAL MEDICINE

## 2018-02-21 RX ORDER — ASPIRIN 81 MG/1
81 TABLET ORAL DAILY
COMMUNITY

## 2018-02-21 RX ORDER — CLINDAMYCIN HYDROCHLORIDE 150 MG/1
CAPSULE ORAL
COMMUNITY
Start: 2018-01-29 | End: 2018-02-22

## 2018-02-21 RX ORDER — AZITHROMYCIN 250 MG/1
TABLET, FILM COATED ORAL
COMMUNITY
Start: 2018-01-29 | End: 2018-02-22

## 2018-02-21 ASSESSMENT — ENCOUNTER SYMPTOMS
PALPITATIONS: 0
PND: 0
INSOMNIA: 0
DIZZINESS: 0
BLURRED VISION: 0
MYALGIAS: 0
LOSS OF CONSCIOUSNESS: 0
ABDOMINAL PAIN: 0
WEAKNESS: 1
SHORTNESS OF BREATH: 0
CHILLS: 0
BACK PAIN: 1
FEVER: 0
WEIGHT LOSS: 1
ORTHOPNEA: 0
HEADACHES: 1
POLYDIPSIA: 1

## 2018-02-21 NOTE — PROGRESS NOTES
"Subjective:   Kim Lora is a 72 y.o. female who presents today self-referred for a cardiology consultation for evaluation of chest heaviness.     The patient patient has significant past medical history of mitral valve prolapse diagnosed with a \"click\" in 1970s by Dr. 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 Spencer Luciano, neurosurgeon on chronic aspirin therapy which is unchanged based on a recent brain CTA on 1/31/2018.    For the past 4 weeks the patient has suffered from a severe case of the flu which she has been profoundly fatigued, weak, briefly bedridden having lost 10 pounds, briefly treated with Tamiflu and seen at urgent care.  She has been gradually getting better.  During this time however she is developed recurrent concerning on and off nonpleuritic chest heaviness with left arm heaviness and hand tingling.    Currently the patient denies any fevers, chills, PND, orthopnea, lower extremity edema or palpitations.    The patient has generally been extremely healthy, very active in pursuing outdoor activities such as hiking.    The patient has no prior history of hypertension, diabetes mellitus or hyperlipidemia and has never smoked cigarettes.    History reviewed. No pertinent past medical history.  Past Surgical History:   Procedure Laterality Date   • US-NEEDLE CORE BX-BREAST PANEL       Family History   Problem Relation Age of Onset   • Cancer Mother      breast     History   Smoking Status   • Never Smoker   Smokeless Tobacco   • Never Used     Allergies   Allergen Reactions   • Clarithromycin Swelling     Facial swelling   • Penicillin V Swelling     Facial Swelling   • Phenolphthalein Unspecified     Per patient, reaction as a child.     Outpatient Encounter Prescriptions as of 2/21/2018   Medication Sig Dispense " "Refill   • aspirin 81 MG EC tablet Take 81 mg by mouth every day.     • lansoprazole (PREVACID) 15 MG CAPSULE DELAYED RELEASE Take 15 mg by mouth every day.     • azithromycin (ZITHROMAX) 250 MG Tab      • clindamycin (CLEOCIN) 150 MG Cap      • oseltamivir (TAMIFLU) 75 MG Cap Take 75 mg by mouth 2 times a day.     • meclizine (ANTIVERT) 25 MG Tab Take 1 Tab by mouth 3 times a day as needed (vertigo). No driving, no drinking alcohol. (Patient not taking: Reported on 2/21/2018) 15 Tab 0     No facility-administered encounter medications on file as of 2/21/2018.      Review of Systems   Constitutional: Positive for malaise/fatigue and weight loss. Negative for chills and fever.   HENT: Positive for congestion.    Eyes: Negative for blurred vision.   Respiratory: Negative for shortness of breath.    Cardiovascular: Positive for chest pain. Negative for palpitations, orthopnea, leg swelling and PND.   Gastrointestinal: Negative for abdominal pain.   Genitourinary: Negative for dysuria.   Musculoskeletal: Positive for back pain. Negative for joint pain and myalgias.   Skin: Negative for rash.   Neurological: Positive for weakness and headaches. Negative for dizziness and loss of consciousness.   Endo/Heme/Allergies: Positive for environmental allergies and polydipsia.   Psychiatric/Behavioral: The patient does not have insomnia.         Objective:   /70   Pulse 88   Resp 12   Ht 1.676 m (5' 6\")   Wt 55.3 kg (122 lb)   SpO2 97%   BMI 19.69 kg/m²     Physical Exam   Constitutional: She is oriented to person, place, and time. She appears well-nourished. No distress.   HENT:   Head: Normocephalic and atraumatic.   Eyes: Conjunctivae and EOM are normal. Pupils are equal, round, and reactive to light. No scleral icterus.   Neck: Carotid bruit is not present. No thyromegaly present.   Normal jugular venous pressure.   Cardiovascular: Normal rate, regular rhythm, S1 normal and S2 normal.  Exam reveals no gallop and " no friction rub.    No murmur heard.  Pulses:       Carotid pulses are 2+ on the right side, and 2+ on the left side.       Radial pulses are 2+ on the right side, and 2+ on the left side.        Femoral pulses are 2+ on the right side, and 2+ on the left side.       Posterior tibial pulses are 2+ on the right side, and 2+ on the left side.   No femoral bruits.   Pulmonary/Chest: Effort normal and breath sounds normal. She has no wheezes. She has no rhonchi. She has no rales.   Abdominal: Soft. Bowel sounds are normal. She exhibits no abdominal bruit, no pulsatile midline mass and no mass. There is no hepatosplenomegaly. There is no tenderness.   Musculoskeletal: She exhibits no edema.   Lymphadenopathy:     She has no cervical adenopathy.   Neurological: She is alert and oriented to person, place, and time. She has normal strength. Gait normal.   Skin: Skin is warm and dry. No cyanosis. Nails show no clubbing.   Psychiatric: She has a normal mood and affect. Her behavior is normal.     02/21/2018 EKG: Normal sinus rhythm, rate 61. Left anterior fascicular block. Anterior T-wave abnormalities. RSR prime in lead V1 and V2. Unchanged from 1/2017 ECG tracing. Reviewed by myself.    Assessment:     1. Chest heaviness  EKG    ECHOCARDIOGRAM COMP W/O CONT   2. Abnormal EKG  ECHOCARDIOGRAM COMP W/O CONT   3. Flu syndrome         Medical Decision Making:  Today's Assessment / Status / Plan:     I had a detailed discussion with the patient with regards to her current cardiac condition in the context of her severe flu symptoms, chest symptoms and EKG findings.  We'll obtain  an echocardiogram.  The patient reports that her daughter who is a local ObGyn physician  ordered lab tests at [facility] which the patient had drawn yesterday in which I will get a copy of.    Follow-up one month.

## 2018-02-22 ENCOUNTER — OFFICE VISIT (OUTPATIENT)
Dept: MEDICAL GROUP | Facility: MEDICAL CENTER | Age: 73
End: 2018-02-22
Payer: MEDICARE

## 2018-02-22 VITALS
BODY MASS INDEX: 19.93 KG/M2 | HEART RATE: 77 BPM | DIASTOLIC BLOOD PRESSURE: 64 MMHG | SYSTOLIC BLOOD PRESSURE: 116 MMHG | TEMPERATURE: 99.3 F | WEIGHT: 124 LBS | HEIGHT: 66 IN | OXYGEN SATURATION: 95 %

## 2018-02-22 DIAGNOSIS — Z86.19 HISTORY OF EPSTEIN-BARR VIRUS INFECTION: ICD-10-CM

## 2018-02-22 DIAGNOSIS — E78.00 PURE HYPERCHOLESTEROLEMIA: ICD-10-CM

## 2018-02-22 DIAGNOSIS — Z85.828 HISTORY OF MOHS SURGERY FOR SQUAMOUS CELL CARCINOMA OF SKIN: ICD-10-CM

## 2018-02-22 DIAGNOSIS — I34.1 MITRAL VALVE PROLAPSE: ICD-10-CM

## 2018-02-22 DIAGNOSIS — Z98.890 STATUS POST MOHS SURGERY FOR BASAL CELL CARCINOMA: ICD-10-CM

## 2018-02-22 DIAGNOSIS — Z76.89 ENCOUNTER TO ESTABLISH CARE WITH NEW DOCTOR: ICD-10-CM

## 2018-02-22 DIAGNOSIS — Z85.828 STATUS POST MOHS SURGERY FOR BASAL CELL CARCINOMA: ICD-10-CM

## 2018-02-22 DIAGNOSIS — Z12.31 ENCOUNTER FOR SCREENING MAMMOGRAM FOR MALIGNANT NEOPLASM OF BREAST: ICD-10-CM

## 2018-02-22 DIAGNOSIS — Z98.890 HISTORY OF MOHS SURGERY FOR SQUAMOUS CELL CARCINOMA OF SKIN: ICD-10-CM

## 2018-02-22 PROBLEM — R07.89 CHEST HEAVINESS: Status: RESOLVED | Noted: 2018-02-21 | Resolved: 2018-02-22

## 2018-02-22 PROBLEM — J11.1 FLU SYNDROME: Status: RESOLVED | Noted: 2018-02-21 | Resolved: 2018-02-22

## 2018-02-22 PROCEDURE — 99214 OFFICE O/P EST MOD 30 MIN: CPT | Performed by: FAMILY MEDICINE

## 2018-02-22 ASSESSMENT — PATIENT HEALTH QUESTIONNAIRE - PHQ9: CLINICAL INTERPRETATION OF PHQ2 SCORE: 0

## 2018-02-22 NOTE — LETTER
[facility]  Milton Rhodes M.D.  [address]  [place] [address]  Fax: [phone]   Authorization for Release/Disclosure of   Protected Health Information   Name: CHERYL ZIEGLER : 1945 SSN: [id number]   Address: [address] [place] [address]  [place] [address] Phone:    [phone] (home)    I authorize the entity listed below to release/disclose the PHI below to:   Formerly [facility]/Milton Rhodes M.D. and Milton Rhodes M.D.   Provider or Entity Name:  [facility]   Address   [facility], [facility], Zip            [address], [place] [address] Phone:  [phone]      Fax:       [phone]          Reason for request: continuity of care   Information to be released:    [ X ] LAST COLONOSCOPY,  including any PATH REPORT and follow-up  [ X ] LAST FIT/COLOGUARD RESULT [  ] LAST DEXA  [  ] LAST MAMMOGRAM  [  ] LAST PAP  [  ] LAST LABS [  ] RETINA EXAM REPORT  [  ] IMMUNIZATION RECORDS  [  ] Release all info      [  ] Check here and initial the line next to each item to release ALL health information INCLUDING  _____ Care and treatment for drug and / or alcohol abuse  _____ HIV testing, infection status, or AIDS  _____ Genetic Testing    DATES OF SERVICE OR TIME PERIOD TO BE DISCLOSED: _____________  I understand and acknowledge that:  * This Authorization may be revoked at any time by you in writing, except if your health information has already been used or disclosed.  * Your health information that will be used or disclosed as a result of you signing this authorization could be re-disclosed by the recipient. If this occurs, your re-disclosed health information may no longer be protected by State or Federal laws.  * You may refuse to sign this Authorization. Your refusal will not affect your ability to obtain treatment.  * This Authorization becomes effective upon signing and will  on (date) __________.      If no date is indicated, this Authorization will  one (1) year from  the signature date.    Name: Kim Andrade Guido    Signature:   Date:     2/22/2018       PLEASE FAX REQUESTED RECORDS BACK TO: [phone]

## 2018-02-22 NOTE — LETTER
[facility]  Milton Rhodes M.D.  [address]  [place] [address]  Fax: [phone]   Authorization for Release/Disclosure of   Protected Health Information   Name: KIM ZIEGLER : 1945 SSN: [id number]   Address: [address] [place] [address]  [place] [address] Phone:    [phone] (home)    I authorize the entity listed below to release/disclose the PHI below to:   Formerly [facility]/Milton Rhodes M.D. and Milton Rhodes M.D.   Provider or Entity Name:     Address   City, State, Zip   Phone:      Fax:     Reason for request: continuity of care   Information to be released:    [  ] LAST COLONOSCOPY,  including any PATH REPORT and follow-up  [  ] LAST FIT/COLOGUARD RESULT [  ] LAST DEXA  [  ] LAST MAMMOGRAM  [  ] LAST PAP  [  ] LAST LABS [  ] RETINA EXAM REPORT  [  ] IMMUNIZATION RECORDS  [  ] Release all info      [  ] Check here and initial the line next to each item to release ALL health information INCLUDING  _____ Care and treatment for drug and / or alcohol abuse  _____ HIV testing, infection status, or AIDS  _____ Genetic Testing    DATES OF SERVICE OR TIME PERIOD TO BE DISCLOSED: _____________  I understand and acknowledge that:  * This Authorization may be revoked at any time by you in writing, except if your health information has already been used or disclosed.  * Your health information that will be used or disclosed as a result of you signing this authorization could be re-disclosed by the recipient. If this occurs, your re-disclosed health information may no longer be protected by State or Federal laws.  * You may refuse to sign this Authorization. Your refusal will not affect your ability to obtain treatment.  * This Authorization becomes effective upon signing and will  on (date) __________.      If no date is indicated, this Authorization will  one (1) year from the signature date.    Name: Kim Ziegler    Signature:   Date:     2018       PLEASE FAX  REQUESTED RECORDS BACK TO: [phone]

## 2018-02-26 PROBLEM — E78.00 PURE HYPERCHOLESTEROLEMIA: Status: ACTIVE | Noted: 2018-02-26

## 2018-02-26 NOTE — ASSESSMENT & PLAN NOTE
Patient with history of Sebastian-Barr virus infection, however is unsure if this is a chronic condition or if this was in the past only.     ROS: Negative for fevers, chills, night sweats, jaundice weakness/fatigue, weight loss, abdominal fullness, throat pain.

## 2018-02-26 NOTE — ASSESSMENT & PLAN NOTE
Patient with history of HLD, not currently taking medication for cholesterol.  Patient tries to eat healthy & exercise on a regular basis.  Patient would like to hold off on getting labs now as she feels that she ate less well than she normally does, as well as having less cardiovascular exercise than normal.    ROS is NEGATIVE for dizziness, generalized weakness/fatigue, cold sweats, dizziness,  vision/hearing changes, jaw pain/paresthesias, BUE pain/paresthesias/numbness/weakness, chest pain/pressure, palpitations, dyspnea, nausea, RUQ abdominal pain, oliguria/anuria, BLE edema.

## 2018-02-26 NOTE — PROGRESS NOTES
Subjective:   Chief Complaint/History of Present Illness:  Kim Lora is a 72 y.o. female patient new to [facility] who presents today to establish primary medical care and to discuss HLD, MVP, and skin conditions.  PMH, PSH, Social History, Medications, Allergies all reviewed as documented:    Pure hypercholesterolemia  Patient with history of HLD, not currently taking medication for cholesterol.  Patient tries to eat healthy & exercise on a regular basis.  Patient would like to hold off on getting labs now as she feels that she ate less well than she normally does, as well as having less cardiovascular exercise than normal.    ROS is NEGATIVE for dizziness, generalized weakness/fatigue, cold sweats, dizziness,  vision/hearing changes, jaw pain/paresthesias, BUE pain/paresthesias/numbness/weakness, chest pain/pressure, palpitations, dyspnea, nausea, RUQ abdominal pain, oliguria/anuria, BLE edema.    Mitral valve prolapse  Chronic, stable, well-controlled.  Patient doesn't have s/sx of cardiac dysfunction, no dyspnea/lightheadedness/respiratory distress with exertion.    History of Mohs surgery for squamous cell carcinoma of skin  Chronic, stable, controlled. Patient without any skin conditions or present time. Patient states that she has had no surgery in the past for both squamous cell carcinoma as well as basal cell carcinomas of the skin.    Patient uses sunscreen    Status post Mohs surgery for basal cell carcinoma  Please see notes from same date of service 2/22/2018 Re: History of Mohs surgery for squamous cell carcinoma skin.    History of Sebastian-Barr virus infection  Patient with history of Sebastian-Barr virus infection, however is unsure if this is a chronic condition or if this was in the past only.     ROS: Negative for fevers, chills, night sweats, jaundice weakness/fatigue, weight loss, abdominal fullness, throat pain.      Patient Active Problem List    Diagnosis Date Noted   • Pure  hypercholesterolemia 02/26/2018   • Mitral valve prolapse 02/22/2018   • History of Mohs surgery for squamous cell carcinoma of skin 02/22/2018   • Status post Mohs surgery for basal cell carcinoma 02/22/2018   • History of Sebastian-Barr virus infection 02/22/2018   • Abnormal EKG 02/21/2018       Additional History:   Allergies:    Clarithromycin; Penicillin v; and Phenolphthalein     Medications:     Current Outpatient Prescriptions Ordered in [facility]   Medication Sig Dispense Refill   • aspirin 81 MG EC tablet Take 81 mg by mouth every day.     • lansoprazole (PREVACID) 15 MG CAPSULE DELAYED RELEASE Take 15 mg by mouth every day.       No current [facility]-ordered facility-administered medications on file.         Past Medical History:   History reviewed. No pertinent past medical history.     Past Surgical History:     Past Surgical History:   Procedure Laterality Date   • CHOLECYSTECTOMY     • LUMPECTOMY     • US-NEEDLE CORE BX-BREAST PANEL          Social History:     Social History   Substance Use Topics   • Smoking status: Never Smoker   • Smokeless tobacco: Never Used   • Alcohol use No        Family History:     Family Status   Relation Status   • Mother         Family History   Problem Relation Age of Onset   • Cancer Mother      breast       ROS:     - Constitutional: Negative for fever, chills, unexpected weight change, and fatigue/generalized weakness.     - HEENT: Negative for headaches, vision changes, hearing changes, ear pain, ear discharge, rhinorrhea, sinus congestion, sore throat, and neck pain.      - Respiratory: Negative for cough, sputum production, chest congestion, dyspnea, wheezing, and crackles.      - Cardiovascular: Negative for chest pain, palpitations, orthopnea, and bilateral lower extremity edema.     - Gastrointestinal: Negative for heartburn, nausea, vomiting, abdominal pain, hematochezia, melena, diarrhea, constipation, and greasy/foul-smelling stools.     - Musculoskeletal: Negative for  "myalgias, back pain, and joint pain.     - NOTE: All other systems reviewed and are negative, except as in HPI.     Objective:   Physical Exam:    Vitals: Blood pressure 116/64, pulse 77, temperature 37.4 °C (99.3 °F), height 1.676 m (5' 6\"), weight 56.2 kg (124 lb), SpO2 95 %, not currently breastfeeding.   BMI: Body mass index is 20.01 kg/m².   General/Constitutional: Vitals as above, Well nourished, well developed female in no acute distress   Head/Eyes:  - Head is grossly normal & atraumatic  - Bilateral conjunctivae clear and not injected, bilateral EOMI, bilateral PERRL   ENT:   - Bilateral external ears grossly normal in appearance, external auditory canals clear & bilateral TMs visualized with appropriate cone of light reflex, hearing grossly intact  - External nares normal in appearance and without discharge/bleeding, bilateral turbinates non-erythematous/non-edematous and without discharge/bleeding  - Good dentition,  posterior oropharynx without erythema/edema/exudates  Neck: Neck supple, no masses, neck non-tender to palpation, no thyromegaly/goiter   Respiratory: No respiratory distress, bilateral lungs are clear to auscultation in all lung fields (anterior/lateral/posterior), no wheezing/rhonchi/rales   Cardiovascular: Regular rate and rhythm with midsystolic click with soft blowing systolic murmur, distal pulses equal and 2+ bilaterally (radial, posterior tibial), no bilateral lower extremity edema   Gastrointestinal: Abdomen resonant to percussion, Bowel sounds present in all 4 quadrants, abdomen non-tender to light and deep palpation   MSK: Gait grossly normal & not antalgic, no tenderness to percussion of vertebral processes, no CVAT, no bilateral SI joint tenderness   Integumentary: No apparent rashes   Neuro: Gross motor movement intact in all 4 extremities, Gross sensation intact to extremities and trunk, Gait grossly normal and not antalgic   Psych: Judgment grossly appropriate, no apparent " depression/anxiety    Health Maintenance:     - I have requested previous records ( ), and will update accordingly.    Imaging/Labs:     - 01/2018 -- CBC, CMP, and TSH all WNL    Assessment and Plan:   1. Encounter to establish care with new doctor  Patient in great health, overall, by history   - LIPID PROFILE; Future    2. Pure hypercholesterolemia  Chronic, unknown controlled.     A) Lifestyle changes: Patient and I discussed the importance of lifestyle changes, with particular emphasis on plant-based nutrition (for the purposes of weight loss, general health, HLD), as well as cardiovascular exercise, proper sleep, and stress management.  This discussion is briefly summarized in the patient instruction section below.  Patient verbalized understanding.   B) Evaluation: Patient to get Lipid profile, and we will discuss particular lifestyle changes management thereafter.      - LIPID PROFILE; Future    3. Mitral valve prolapse  Chronic, stable, well-controlled.    4. History of Mohs surgery for squamous cell carcinoma of skin  5. Status post Mohs surgery for basal cell carcinoma  Chronic, stable, well-controlled at present.  Patient to present for skin concerns in the future.    6. History of Sebastian-Barr virus infection  Chronic, unknown control.  We should consider labs in the future.    7. Encounter for screening mammogram for malignant neoplasm of breast  Unknown control, mammo to evaluate.   - MA-SCREEN MAMMO W/CAD-BILAT; Future      RTC: in 3months for HLD management, lifestyle changes management.    PLEASE NOTE: This dictation was created using voice recognition software. I have made every reasonable attempt to correct obvious errors, but I expect that there are errors of grammar and possibly content that I did not discover before finalizing the note.

## 2018-02-26 NOTE — ASSESSMENT & PLAN NOTE
Please see notes from same date of service 2/22/2018 Re: History of Mohs surgery for squamous cell carcinoma skin.

## 2018-02-26 NOTE — ASSESSMENT & PLAN NOTE
Chronic, stable, well-controlled.  Patient doesn't have s/sx of cardiac dysfunction, no dyspnea/lightheadedness/respiratory distress with exertion.

## 2018-02-26 NOTE — ASSESSMENT & PLAN NOTE
Chronic, stable, controlled. Patient without any skin conditions or present time. Patient states that she has had no surgery in the past for both squamous cell carcinoma as well as basal cell carcinomas of the skin.    Patient uses sunscreen

## 2018-02-28 ENCOUNTER — HOSPITAL ENCOUNTER (OUTPATIENT)
Dept: CARDIOLOGY | Facility: MEDICAL CENTER | Age: 73
End: 2018-02-28
Attending: INTERNAL MEDICINE
Payer: MEDICARE

## 2018-02-28 DIAGNOSIS — R07.89 CHEST HEAVINESS: ICD-10-CM

## 2018-02-28 DIAGNOSIS — R94.31 ABNORMAL EKG: ICD-10-CM

## 2018-02-28 LAB
LV EJECT FRACT  99904: 65
LV EJECT FRACT MOD 2C 99903: 69.06
LV EJECT FRACT MOD 4C 99902: 65.21
LV EJECT FRACT MOD BP 99901: 65.14

## 2018-02-28 PROCEDURE — 93306 TTE W/DOPPLER COMPLETE: CPT

## 2018-02-28 PROCEDURE — 93306 TTE W/DOPPLER COMPLETE: CPT | Mod: 26 | Performed by: INTERNAL MEDICINE

## 2018-03-23 ENCOUNTER — OFFICE VISIT (OUTPATIENT)
Dept: CARDIOLOGY | Facility: MEDICAL CENTER | Age: 73
End: 2018-03-23
Payer: MEDICARE

## 2018-03-23 VITALS
OXYGEN SATURATION: 97 % | HEART RATE: 76 BPM | WEIGHT: 124.6 LBS | DIASTOLIC BLOOD PRESSURE: 62 MMHG | SYSTOLIC BLOOD PRESSURE: 106 MMHG | BODY MASS INDEX: 20.03 KG/M2 | HEIGHT: 66 IN

## 2018-03-23 DIAGNOSIS — R94.31 ABNORMAL EKG: ICD-10-CM

## 2018-03-23 DIAGNOSIS — R07.89 CHEST HEAVINESS: ICD-10-CM

## 2018-03-23 PROBLEM — I34.1 MITRAL VALVE PROLAPSE: Status: RESOLVED | Noted: 2018-02-22 | Resolved: 2018-03-23

## 2018-03-23 PROCEDURE — 99214 OFFICE O/P EST MOD 30 MIN: CPT | Performed by: INTERNAL MEDICINE

## 2018-03-23 ASSESSMENT — ENCOUNTER SYMPTOMS
PND: 0
MYALGIAS: 0
INSOMNIA: 0
BLURRED VISION: 0
ORTHOPNEA: 0
PALPITATIONS: 0
DIZZINESS: 0
WEAKNESS: 0
LOSS OF CONSCIOUSNESS: 0
WEIGHT LOSS: 0
FEVER: 0
HEADACHES: 1
ABDOMINAL PAIN: 0
POLYDIPSIA: 1
BACK PAIN: 1
SHORTNESS OF BREATH: 0
CHILLS: 0

## 2018-03-23 NOTE — PROGRESS NOTES
"Chief Complaint   Patient presents with   • Abnormal EKG       Subjective:   Kim Lora is a 72 y.o. female who presents today for follow-up evaluation of chest heaviness and to review the results of her echocardiogram.    Last seen on 2/21/2018.    Has had some intermittent chest heaviness but seems to have improved.  Has a new PCP who is evaluating her lipid panel in May.    The patient patient has significant past medical history of mitral valve prolapse diagnosed with a \"click\" in 1970s by Dr. 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 Spencer Luciano, neurosurgeon on chronic aspirin therapy which is unchanged based on a recent brain CTA on 1/31/2018.    For the past 4 weeks the patient has suffered from a severe case of the flu which she has been profoundly fatigued, weak, briefly bedridden having lost 10 pounds, briefly treated with Tamiflu and seen at urgent care.  She has been gradually getting better.  During this time however she is developed recurrent concerning on and off nonpleuritic chest heaviness with left arm heaviness and hand tingling.    Currently the patient denies any fevers, chills, PND, orthopnea, lower extremity edema or palpitations.    The patient has generally been extremely healthy, very active in pursuing outdoor activities such as hiking.    The patient has no prior history of hypertension, diabetes mellitus or hyperlipidemia and has never smoked cigarettes.    History reviewed. No pertinent past medical history.  Past Surgical History:   Procedure Laterality Date   • CHOLECYSTECTOMY     • LUMPECTOMY     • US-NEEDLE CORE BX-BREAST PANEL       Family History   Problem Relation Age of Onset   • Cancer Mother      breast     Social History     Social History   • Marital status:      Spouse name: N/A " "  • Number of children: N/A   • Years of education: N/A     Occupational History   • Not on file.     Social History Main Topics   • Smoking status: Never Smoker   • Smokeless tobacco: Never Used   • Alcohol use No   • Drug use: No   • Sexual activity: Yes     Partners: Male     Birth control/ protection: Post-Menopausal     Other Topics Concern   • Not on file     Social History Narrative   • No narrative on file     Allergies   Allergen Reactions   • Clarithromycin Swelling     Facial swelling   • Penicillin V Swelling     Facial Swelling   • Phenolphthalein Unspecified     Per patient, reaction as a child.     Outpatient Encounter Prescriptions as of 3/23/2018   Medication Sig Dispense Refill   • aspirin 81 MG EC tablet Take 81 mg by mouth every day.     • lansoprazole (PREVACID) 15 MG CAPSULE DELAYED RELEASE Take 15 mg by mouth every day.       No facility-administered encounter medications on file as of 3/23/2018.      Review of Systems   Constitutional: Negative for chills, fever, malaise/fatigue and weight loss.   HENT: Negative for congestion.    Eyes: Negative for blurred vision.   Respiratory: Negative for shortness of breath.    Cardiovascular: Positive for chest pain. Negative for palpitations, orthopnea, leg swelling and PND.   Gastrointestinal: Negative for abdominal pain.   Genitourinary: Negative for dysuria.   Musculoskeletal: Positive for back pain. Negative for joint pain and myalgias.   Skin: Negative for rash.   Neurological: Positive for headaches. Negative for dizziness, loss of consciousness and weakness.   Endo/Heme/Allergies: Positive for environmental allergies and polydipsia.   Psychiatric/Behavioral: The patient does not have insomnia.         Objective:   /62   Pulse 76   Ht 1.676 m (5' 6\")   Wt 56.5 kg (124 lb 9.6 oz)   SpO2 97%   BMI 20.11 kg/m²     Physical Exam   Constitutional: She is oriented to person, place, and time. No distress.   HENT:   Head: Normocephalic and " atraumatic.   Eyes: EOM are normal. No scleral icterus.   Neck: No JVD present.   Cardiovascular: Normal rate, regular rhythm and intact distal pulses.    No murmur heard.  Pulmonary/Chest: Effort normal and breath sounds normal. No respiratory distress. She has no wheezes.   Musculoskeletal: She exhibits no edema.   Neurological: She is alert and oriented to person, place, and time.   Skin: Skin is warm and dry.   Psychiatric: She has a normal mood and affect. Her behavior is normal.     CTA HEAD/NECK 01/08/2017  1.  Mild, less than 50%, atherosclerotic narrowing of the carotid bulbs and proximal internal carotid arteries bilaterally, slightly more apparent on the LEFT.  2.  No evidence for focal high-grade stenosis, dissection or occlusion of the cervical carotid or vertebral arteries.  1.  Small caliber basilar artery.  Prominence of the distal basilar artery concerning for 2 mm aneurysm.  2.  No abrupt vessel cut off to indicate occlusion.  3.  Persistent carotid origin of the posterior cerebral arteries, normal variant.    CTA HEAD/NECK 01/31/2018  1.  No change in mild to moderate atherosclerotic plaque in the aorta and carotid bulbs and proximal ICA bilaterally.  2.  No significant stenosis identified. Diameter reduction in each ICA is between 0% to 49%.  1.  Prominence of distal basilar artery is again noted concerning for 2 mm aneurysm. Findings are unchanged compared to the prior exam.  2.  No new abnormalities have developed since the prior examination.    2002 STRESS ECHOCARDIOGRAM: normal.    2008 STRESS ECHOCARDIOGRAM: Normal.    ECHOCARDIOGRAM 02/28/2018  Normal left ventricular systolic function.  Left ventricular ejection fraction is visually estimated to be 65%.  Right heart pressures are normal.  No significant valve abnormalities.   No prior study is available for comparison.     02/21/2018 EKG: Normal sinus rhythm, rate 61. Left anterior fascicular block. Anterior T-wave abnormalities. RSR prime  in lead V1 and V2. Unchanged from 1/2017 ECG tracing. Reviewed by myself.    Assessment:     1. Chest heaviness  TREADMILL STRESS   2. Abnormal EKG  TREADMILL STRESS       Medical Decision Making:  Today's Assessment / Status / Plan:   1. Chest heaviness. Atypical.    2. Abnormal EKG.    3. History of mitral valve prolapse. Current echocardiogram shows no evidence of mitral valve prolapse.    Recommendation  1. I reviewed the results in addition to the images of her echocardiogram showing no evidence of mitral valve prolapse.  2. Standard exercise treadmill stress test.    Follow-up will be determined based on the results of the treadmill stress test.

## 2018-03-26 ENCOUNTER — HOSPITAL ENCOUNTER (OUTPATIENT)
Dept: RADIOLOGY | Facility: MEDICAL CENTER | Age: 73
End: 2018-03-26
Attending: OBSTETRICS & GYNECOLOGY
Payer: MEDICARE

## 2018-03-26 DIAGNOSIS — Z12.31 VISIT FOR SCREENING MAMMOGRAM: ICD-10-CM

## 2018-03-26 PROCEDURE — 77063 BREAST TOMOSYNTHESIS BI: CPT

## 2018-03-27 ENCOUNTER — TELEPHONE (OUTPATIENT)
Dept: MEDICAL GROUP | Facility: MEDICAL CENTER | Age: 73
End: 2018-03-27

## 2018-03-28 NOTE — TELEPHONE ENCOUNTER
Can you please link her most recent mammogram (from 03/26/18) to her health maintenance topic for mammogram?  Next one is due in 1year.  Thank you.

## 2018-03-29 ENCOUNTER — NON-PROVIDER VISIT (OUTPATIENT)
Dept: CARDIOLOGY | Facility: MEDICAL CENTER | Age: 73
End: 2018-03-29
Attending: INTERNAL MEDICINE
Payer: MEDICARE

## 2018-03-29 VITALS
HEART RATE: 71 BPM | BODY MASS INDEX: 19.93 KG/M2 | DIASTOLIC BLOOD PRESSURE: 70 MMHG | OXYGEN SATURATION: 96 % | WEIGHT: 124 LBS | HEIGHT: 66 IN | SYSTOLIC BLOOD PRESSURE: 118 MMHG

## 2018-03-29 DIAGNOSIS — R94.31 ABNORMAL EKG: ICD-10-CM

## 2018-03-29 DIAGNOSIS — R07.89 CHEST HEAVINESS: ICD-10-CM

## 2018-03-29 LAB — TREADMILL STRESS: NORMAL

## 2018-03-29 PROCEDURE — 93015 CV STRESS TEST SUPVJ I&R: CPT | Performed by: INTERNAL MEDICINE

## 2018-04-02 ENCOUNTER — TELEPHONE (OUTPATIENT)
Dept: CARDIOLOGY | Facility: MEDICAL CENTER | Age: 73
End: 2018-04-02

## 2018-04-02 NOTE — TELEPHONE ENCOUNTER
Pt called with results. No answer, left  for call back. My [place] message sent as well.  Arabella GALLARDO RN     CONCLUSION:  1. Good exercise capacity.  2. Negative exercise treadmill stress test for ischemia.  3. Normal blood pressure response to exercise.  4. No significant arrhythmias.    Electronically Signed By: Eugene Obrien M.D.

## 2018-05-18 ENCOUNTER — HOSPITAL ENCOUNTER (OUTPATIENT)
Dept: LAB | Facility: MEDICAL CENTER | Age: 73
End: 2018-05-18
Attending: FAMILY MEDICINE
Payer: MEDICARE

## 2018-05-18 DIAGNOSIS — E78.00 PURE HYPERCHOLESTEROLEMIA: ICD-10-CM

## 2018-05-18 DIAGNOSIS — Z76.89 ENCOUNTER TO ESTABLISH CARE WITH NEW DOCTOR: ICD-10-CM

## 2018-05-18 LAB
CHOLEST SERPL-MCNC: 217 MG/DL (ref 100–199)
HDLC SERPL-MCNC: 79 MG/DL
LDLC SERPL CALC-MCNC: 119 MG/DL
TRIGL SERPL-MCNC: 94 MG/DL (ref 0–149)

## 2018-05-18 PROCEDURE — 36415 COLL VENOUS BLD VENIPUNCTURE: CPT

## 2018-05-18 PROCEDURE — 80061 LIPID PANEL: CPT

## 2018-05-22 ENCOUNTER — OFFICE VISIT (OUTPATIENT)
Dept: MEDICAL GROUP | Facility: MEDICAL CENTER | Age: 73
End: 2018-05-22
Payer: MEDICARE

## 2018-05-22 VITALS
WEIGHT: 125 LBS | DIASTOLIC BLOOD PRESSURE: 72 MMHG | OXYGEN SATURATION: 97 % | TEMPERATURE: 98.4 F | HEART RATE: 60 BPM | HEIGHT: 66 IN | SYSTOLIC BLOOD PRESSURE: 106 MMHG | BODY MASS INDEX: 20.09 KG/M2

## 2018-05-22 DIAGNOSIS — E78.00 PURE HYPERCHOLESTEROLEMIA: ICD-10-CM

## 2018-05-22 DIAGNOSIS — R94.31 ABNORMAL EKG: ICD-10-CM

## 2018-05-22 DIAGNOSIS — Z00.00 MEDICARE ANNUAL WELLNESS VISIT, SUBSEQUENT: ICD-10-CM

## 2018-05-22 DIAGNOSIS — Z78.0 POSTMENOPAUSAL ESTROGEN DEFICIENCY: ICD-10-CM

## 2018-05-22 DIAGNOSIS — M85.80 OSTEOPENIA DETERMINED BY X-RAY: ICD-10-CM

## 2018-05-22 DIAGNOSIS — R07.89 CHEST HEAVINESS: ICD-10-CM

## 2018-05-22 PROCEDURE — 99214 OFFICE O/P EST MOD 30 MIN: CPT | Performed by: FAMILY MEDICINE

## 2018-05-23 ENCOUNTER — TELEPHONE (OUTPATIENT)
Dept: CARDIOLOGY | Facility: MEDICAL CENTER | Age: 73
End: 2018-05-23

## 2018-05-23 NOTE — TELEPHONE ENCOUNTER
Kim Obrien M.D. 12 hours ago (7:24 PM)        Now that  you have my lipid report as of  5-18 18,  what do you suggest to lower ?  Thank you for seeing me and running me thru the tests for my heart with heart symptoms from the flu. I am feeling better but not at full energy level yet but getting there.   Regards,  Kim Ziegler         Results for KIM ZIEGLER (MRN [id number]) as of 5/23/2018 08:22   Ref. Range 5/18/2018 07:16   Cholesterol,Tot Latest Ref Range: 100 - 199 mg/dL 217 (H)   Triglycerides Latest Ref Range: 0 - 149 mg/dL 94   HDL Latest Ref Range: >=40 mg/dL 79   LDL Latest Ref Range: <100 mg/dL 119 (H)         Last office visit on 3/23/18    To YANICK to review and advise per patient MyChart message

## 2018-05-25 NOTE — ASSESSMENT & PLAN NOTE
Patient with atypical chest pain, as well as EKG that was questionable for possible ischemic cardiomyopathy.  However, review of records reveals the patient's treadmill stress echo was negative.  Therefore, abnormal EKG is normal pathologically significant at present time.    ROS is NEGATIVE for dizziness, generalized weakness/fatigue, cold sweats, dizziness,  vision/hearing changes, jaw pain/paresthesias, BUE pain/paresthesias/numbness/weakness, chest pain/pressure, palpitations, dyspnea, nausea, RUQ abdominal pain, oliguria/anuria, BLE edema.

## 2018-05-25 NOTE — ASSESSMENT & PLAN NOTE
Chronic, uncontrolled, please see notes from same date of service 5/22/2018 regarding chest tightness.

## 2018-05-25 NOTE — ASSESSMENT & PLAN NOTE
Patient and I discussed recent labs (see below; pure hypercholesterolemia, uncontrolled) and that ASCVD risk based on most recent lipid panel, current blood pressure (non-hypertensive), diabetes status (non-diabetic), and smoking status (non-smoker) is: calculated 10-year risk 9.1%.    Patient and I then discussed necessary dietary changes to make to address dyslipidemia.   Patient verbalized understanding.  Patient is not currently taking cholesterol lowering medication.      ROS is NEGATIVE for dizziness, generalized weakness/fatigue, vision/hearing changes, jaw pain/paresthesias, BUE pain/paresthesias/numbness/weakness, chest pain/pressure, palpitations, dyspnea, RUQ abdominal pain, oliguria/anuria, BLE edema.    Lab Results   Component Value Date/Time    CHOLSTRLTOT 217 (H) 05/18/2018 07:16 AM     (H) 05/18/2018 07:16 AM    HDL 79 05/18/2018 07:16 AM    TRIGLYCERIDE 94 05/18/2018 07:16 AM

## 2018-05-25 NOTE — ASSESSMENT & PLAN NOTE
Chronic problems under control, patient states she DEXA scan was more than 2 years ago.  Patient has not had more than 2 falls within the posterior hand patient does not have any myalgias or bone pains.

## 2018-05-25 NOTE — PROGRESS NOTES
Subjective:   Chief Complaint/History of Present Illness:  Kim Lora is a 72 y.o. female established patient who presents today to discuss medical problems as listed below    Diagnoses of Pure hypercholesterolemia, Osteopenia determined by x-ray, Postmenopausal estrogen deficiency, Chest heaviness, Abnormal EKG, and Medicare annual wellness visit, subsequent were pertinent to this visit.    Pure hypercholesterolemia  Patient and I discussed recent labs (see below; pure hypercholesterolemia, uncontrolled) and that ASCVD risk based on most recent lipid panel, current blood pressure (non-hypertensive), diabetes status (non-diabetic), and smoking status (non-smoker) is: calculated 10-year risk 9.1%.    Patient and I then discussed necessary dietary changes to make to address dyslipidemia.   Patient verbalized understanding.  Patient is not currently taking cholesterol lowering medication.      ROS is NEGATIVE for dizziness, generalized weakness/fatigue, vision/hearing changes, jaw pain/paresthesias, BUE pain/paresthesias/numbness/weakness, chest pain/pressure, palpitations, dyspnea, RUQ abdominal pain, oliguria/anuria, BLE edema.    Lab Results   Component Value Date/Time    CHOLSTRLTOT 217 (H) 05/18/2018 07:16 AM     (H) 05/18/2018 07:16 AM    HDL 79 05/18/2018 07:16 AM    TRIGLYCERIDE 94 05/18/2018 07:16 AM       Osteopenia determined by x-ray  Chronic problems under control, patient states she DEXA scan was more than 2 years ago.  Patient has not had more than 2 falls within the posterior hand patient does not have any myalgias or bone pains.    Chest heaviness  Patient with atypical chest pain, as well as EKG that was questionable for possible ischemic cardiomyopathy.  However, review of records reveals the patient's treadmill stress echo was negative.  Therefore, abnormal EKG is normal pathologically significant at present time.    ROS is NEGATIVE for dizziness, generalized weakness/fatigue, cold  "sweats, dizziness,  vision/hearing changes, jaw pain/paresthesias, BUE pain/paresthesias/numbness/weakness, chest pain/pressure, palpitations, dyspnea, nausea, RUQ abdominal pain, oliguria/anuria, BLE edema.     Abnormal EKG  Chronic, uncontrolled, please see notes from same date of service 5/22/2018 regarding chest tightness.      Patient Active Problem List    Diagnosis Date Noted   • Osteopenia determined by x-ray 05/22/2018   • Chest heaviness 03/23/2018   • Pure hypercholesterolemia 02/26/2018   • History of Mohs surgery for squamous cell carcinoma of skin 02/22/2018   • Status post Mohs surgery for basal cell carcinoma 02/22/2018   • History of Sebastian-Barr virus infection 02/22/2018   • Abnormal EKG 02/21/2018       Additional History:   Allergies:    Clarithromycin; Penicillin v; and Phenolphthalein     Current Medications:     Current Outpatient Prescriptions   Medication Sig Dispense Refill   • aspirin 81 MG EC tablet Take 81 mg by mouth every day.     • lansoprazole (PREVACID) 15 MG CAPSULE DELAYED RELEASE Take 15 mg by mouth every day.       No current facility-administered medications for this visit.         Social History:     Social History   Substance Use Topics   • Smoking status: Never Smoker   • Smokeless tobacco: Never Used   • Alcohol use No       ROS:     - NOTE: All other systems reviewed and are negative, except as in HPI.     Objective:   Physical Exam:    Vitals: Blood pressure 106/72, pulse 60, temperature 36.9 °C (98.4 °F), height 1.676 m (5' 6\"), weight 56.7 kg (125 lb), SpO2 97 %.   BMI: Body mass index is 20.18 kg/m².   General/Constitutional: Vitals as above, Well nourished, well developed female in no acute distress   Head/Eyes: Head is grossly normal & atraumatic, bilateral conjunctivae clear and not injected, bilateral EOMI, bilateral PERRL   ENT: Bilateral external ears grossly normal in appearance, Hearing grossly intact, External nares normal in appearance and without " discharge/bleeding   Respiratory: No respiratory distress, bilateral lungs are clear to ausculation in all lung fields (anterior/lateral/posterior), no wheezing/rhonchi/rales   Cardiovascular: Regular rate and rhythm without murmur/gallops/rubs, distal pulses are intact and equal bilaterally (radial, posterior tibial), no bilateral lower extremity edema   MSK: Gait grossly normal & not antalgic   Integumentary: No apparent rashes   Psych: Judgment grossly appropriate, no apparent depression/anxiety    Health Maintenance:     - Bone density scan due.  We are requesting previous Pap smear    - 03/26/18 -- Mammo WNL --> Repeat in 1 year    - 01/21/14 -- Colonscopy WNL --> Repeat in 10yrs? (age 79)    Imaging/Labs:     -No new labs to review interpreted apart from those HPI as above.    Assessment and Plan:   1. Pure hypercholesterolemia  Chronic, uncontrolled, Patient and I discussed the importance of lifestyle changes, with particular emphasis on plant-based nutrition (for the purposes of weight loss, general health, HLD, Osteopenia), as well as cardiovascular exercise, proper sleep, and stress management.  This discussion is briefly summarized in the patient instruction section below.  Patient verbalized understanding.   - LIPID PROFILE; Future    2. Osteopenia determined by x-ray  3. Postmenopausal estrogen deficiency  Chronic, unknown control, evaluate with bone density scan as below.   - DS-BONE DENSITY STUDY (DEXA); Future    4. Chest heaviness  5. Abnormal EKG  Chronic, stable, well controlled at this time.    6. Medicare annual wellness visit, subsequent   - DS-BONE DENSITY STUDY (DEXA); Future   - LIPID PROFILE; Future        RTC: In 3-4 months for Medicare annual wellness exam.    PLEASE NOTE: This dictation was created using voice recognition software. I have made every reasonable attempt to correct obvious errors, but I expect that there are errors of grammar and possibly content that I did not discover  before finalizing the note.

## 2018-05-30 NOTE — TELEPHONE ENCOUNTER
Diet   Follow up at time of her PCP appt in 9/2018  Heart stable at this point  No other recommendations

## 2018-08-28 ENCOUNTER — HOSPITAL ENCOUNTER (OUTPATIENT)
Dept: RADIOLOGY | Facility: MEDICAL CENTER | Age: 73
End: 2018-08-28
Attending: FAMILY MEDICINE
Payer: MEDICARE

## 2018-08-28 DIAGNOSIS — Z78.0 POSTMENOPAUSAL ESTROGEN DEFICIENCY: ICD-10-CM

## 2018-08-28 DIAGNOSIS — M85.80 OSTEOPENIA DETERMINED BY X-RAY: ICD-10-CM

## 2018-08-28 DIAGNOSIS — Z00.00 MEDICARE ANNUAL WELLNESS VISIT, SUBSEQUENT: ICD-10-CM

## 2018-08-28 PROCEDURE — 77080 DXA BONE DENSITY AXIAL: CPT

## 2018-08-29 ENCOUNTER — TELEPHONE (OUTPATIENT)
Dept: MEDICAL GROUP | Facility: MEDICAL CENTER | Age: 73
End: 2018-08-29

## 2018-08-29 NOTE — TELEPHONE ENCOUNTER
Left message for patient to call back regarding pre-visit planning. Please transfer call to [phone].

## 2018-08-29 NOTE — TELEPHONE ENCOUNTER
Future Appointments       Provider Department Center    9/5/2018 10:00 AM Milton Rhodes M.D.; [facility]        ANNUAL WELLNESS VISIT PRE-VISIT PLANNING WITHOUT OUTREACH        3.  Immunizations were updated in [facility] using WebIZ?: Yes       •  WebIZ Recommendations: PNEUMOVAX (PPSV23) and SHINGRIX (Shingles)        •  Is patient due for Tdap? NO       •  Is patient due for Shingles? YES. Patient was not notified of copay/out of pocket cost.     4.  Patient is due for the following Health Maintenance Topics:   Health Maintenance Due   Topic Date Due   • PAP SMEAR  08/03/1965   • IMM ZOSTER VACCINES (1 of 2) 08/03/1994   • IMM PNEUMOCOCCAL 65+ (ADULT) LOW/MEDIUM RISK SERIES (2 of 2 - PPSV23) 03/09/2016   • IMM INFLUENZA (1) 09/01/2018         6.  Care Team Updated:       •   DME Company (gait device, O2, CPAP, etc.): N\A       •   Other Specialists (eye doctor, derm, GYN, cardiology, endo, etc): N\A    7.  Patient has the following Care Path diagnoses on Problem List:  NONE    8.  MDX printed and highlighted for Provider? NO    9.  Patient was advised: “This is a free wellness visit. The provider will screen for medical conditions to help you stay healthy. If you have other concerns to address you may be asked to discuss these at a separate visit or there may be an additional fee.”     10.  Patient was informed to arrive 15 min prior to their scheduled appointment and bring in their medication bottles.

## 2018-08-30 NOTE — TELEPHONE ENCOUNTER
Left message for patient to call back regarding pre-visit planning. Please transfer call to [phone].

## 2018-09-05 ENCOUNTER — OFFICE VISIT (OUTPATIENT)
Dept: MEDICAL GROUP | Facility: MEDICAL CENTER | Age: 73
End: 2018-09-05
Payer: MEDICARE

## 2018-09-05 VITALS
HEIGHT: 66 IN | TEMPERATURE: 97.6 F | SYSTOLIC BLOOD PRESSURE: 106 MMHG | BODY MASS INDEX: 19.56 KG/M2 | HEART RATE: 69 BPM | OXYGEN SATURATION: 97 % | DIASTOLIC BLOOD PRESSURE: 66 MMHG | RESPIRATION RATE: 16 BRPM | WEIGHT: 121.69 LBS

## 2018-09-05 DIAGNOSIS — E78.00 PURE HYPERCHOLESTEROLEMIA: ICD-10-CM

## 2018-09-05 DIAGNOSIS — M85.80 OSTEOPENIA DETERMINED BY X-RAY: ICD-10-CM

## 2018-09-05 DIAGNOSIS — Z00.00 MEDICARE ANNUAL WELLNESS VISIT, SUBSEQUENT: Primary | ICD-10-CM

## 2018-09-05 DIAGNOSIS — Z13.6 ENCOUNTER FOR LIPID SCREENING FOR CARDIOVASCULAR DISEASE: ICD-10-CM

## 2018-09-05 DIAGNOSIS — Z13.220 ENCOUNTER FOR LIPID SCREENING FOR CARDIOVASCULAR DISEASE: ICD-10-CM

## 2018-09-05 DIAGNOSIS — Z23 NEED FOR PNEUMOCOCCAL VACCINATION: ICD-10-CM

## 2018-09-05 DIAGNOSIS — Z23 NEED FOR VACCINATION: ICD-10-CM

## 2018-09-05 DIAGNOSIS — R07.89 CHEST HEAVINESS: ICD-10-CM

## 2018-09-05 PROCEDURE — G0439 PPPS, SUBSEQ VISIT: HCPCS | Mod: 25 | Performed by: FAMILY MEDICINE

## 2018-09-05 PROCEDURE — G0009 ADMIN PNEUMOCOCCAL VACCINE: HCPCS | Performed by: FAMILY MEDICINE

## 2018-09-05 PROCEDURE — 90732 PPSV23 VACC 2 YRS+ SUBQ/IM: CPT | Performed by: FAMILY MEDICINE

## 2018-09-05 ASSESSMENT — ACTIVITIES OF DAILY LIVING (ADL): BATHING_REQUIRES_ASSISTANCE: 0

## 2018-09-05 ASSESSMENT — PATIENT HEALTH QUESTIONNAIRE - PHQ9: CLINICAL INTERPRETATION OF PHQ2 SCORE: 0

## 2018-09-05 ASSESSMENT — ENCOUNTER SYMPTOMS: GENERAL WELL-BEING: GOOD

## 2018-09-05 NOTE — ASSESSMENT & PLAN NOTE
This problem is chronic, stable, and monitored appropriately by history/labs/imaging as needed, and is well-controlled on the current regimen.  Please continue current regimen, of weightbearing exercise, as well as supplementation.  Patient states that she is taking at least 2000 international units of vitamin D daily, and calcium 1200 mg daily.  Patient also advised that dietary calcium is typically better absorbed than calcium by supplements.

## 2018-09-05 NOTE — ASSESSMENT & PLAN NOTE
This problem is chronic, stable, and monitored appropriately by history/labs/imaging as needed, and is uncontrolled but anticipated to be improving on the current regimen.  Please continue current regimen which is dietary restriction, as well as cardiovascular status.  We will recheck her lipid profile now to ascertain whether she has had improvement.  Patient is now off of all medications or supplements that could be assisting with cholesterol.

## 2018-09-05 NOTE — PROGRESS NOTES
Chief Complaint   Patient presents with   • Annual Exam         HPI:  Kim is a 73 y.o. here for Medicare Annual Wellness Visit    Osteopenia determined by x-ray  This problem is chronic, stable, and monitored appropriately by history/labs/imaging as needed, and is well-controlled on the current regimen.  Please continue current regimen, of weightbearing exercise, as well as supplementation.  Patient states that she is taking at least 2000 international units of vitamin D daily, and calcium 1200 mg daily.  Patient also advised that dietary calcium is typically better absorbed than calcium by supplements.    Pure hypercholesterolemia  This problem is chronic, stable, and monitored appropriately by history/labs/imaging as needed, and is uncontrolled but anticipated to be improving on the current regimen.  Please continue current regimen which is dietary restriction, as well as cardiovascular status.  We will recheck her lipid profile now to ascertain whether she has had improvement.  Patient is now off of all medications or supplements that could be assisting with cholesterol.    Chest heaviness  Former symptom, now resolved.      Patient Active Problem List    Diagnosis Date Noted   • Osteopenia determined by x-ray 05/22/2018   • Pure hypercholesterolemia 02/26/2018   • H/O Mohs surgery for squamous cell carcinoma of skin 02/22/2018   • H/O Mohs surgery for basal cell carcinoma 02/22/2018   • H/O Sebastian-Barr virus infection 02/22/2018   • Abnormal EKG 02/21/2018       Current Outpatient Prescriptions   Medication Sig Dispense Refill   • Zoster Vac Recomb Adjuvanted (SHINGRIX) 50 MCG Recon Susp 0.5 mL by Intramuscular route Once for 1 dose. 0.5 mL 1   • aspirin 81 MG EC tablet Take 81 mg by mouth every day.     • lansoprazole (PREVACID) 15 MG CAPSULE DELAYED RELEASE Take 15 mg by mouth every day.       No current facility-administered medications for this visit.         Patient is taking medications as noted in  medication list.  Current supplements as per medication list.     Allergies: Clarithromycin; Penicillin v; and Phenolphthalein    Current social contact/activities: Hiking,gym,coffee group, group of friends she gathers with.      Is patient current with immunizations? No, due for FLU, PNEUMOVAX (PPSV23) and SHINGRIX (Shingles). Patient is interested in receiving NONE today.    She  reports that she has never smoked. She has never used smokeless tobacco. She reports that she does not drink alcohol or use drugs.  Counseling given: Not Answered        DPA/Advanced directive: Patient has Advanced Directive, but it is not on file. Instructed to bring in a copy to scan into their chart.    ROS:    Gait: Uses no assistive device   Ostomy: No   Other tubes: No   Amputations: No   Chronic oxygen use No   Last eye exam 12/2017   Wears hearing aids: No   : Denies any urinary leakage during the last 6 months      Depression Screening    Little interest or pleasure in doing things?  0 - not at all  Feeling down, depressed, or hopeless? 0 - not at all  Patient Health Questionnaire Score: 0    If depressive symptoms identified deferred to follow up visit unless specifically addressed in assessment and plan.    Interpretation of PHQ-9 Total Score   Score Severity   1-4 No Depression   5-9 Mild Depression   10-14 Moderate Depression   15-19 Moderately Severe Depression   20-27 Severe Depression    Screening for Cognitive Impairment    Three Minute Recall (leader, season, table)  2/3 (leader, season, table)  Giovanny clock face with all 12 numbers and set the hands to show 10 past 11.  Yes 5/5    If cognitive concerns identified, deferred for follow up unless specifically addressed in assessment and plan.    Fall Risk Assessment    Has the patient had two or more falls in the last year or any fall with injury in the last year?  No  If fall risk identified, deferred for follow up unless specifically addressed in assessment and  plan.    Safety Assessment    Throw rugs on floor.  Yes  Handrails on all stairs.  Yes  Good lighting in all hallways.  Yes  Difficulty hearing.  No  Patient counseled about all safety risks that were identified.    Functional Assessment ADLs    Are there any barriers preventing you from cooking for yourself or meeting nutritional needs?  No.    Are there any barriers preventing you from driving safely or obtaining transportation?  No.    Are there any barriers preventing you from using a telephone or calling for help?  No.    Are there any barriers preventing you from shopping?  No.    Are there any barriers preventing you from taking care of your own finances?  No.    Are there any barriers preventing you from managing your medications?  No.    Are there any barriers preventing you from showering, bathing or dressing yourself?  No.    Are you currently engaging in any exercise or physical activity?  Yes.  Hiking, stretching, treadmill, station bike, planks.  What is your perception of your health?  Good.    Health Maintenance Summary                IMM ZOSTER VACCINES Overdue 8/3/1994     IMM PNEUMOCOCCAL 65+ (ADULT) LOW/MEDIUM RISK SERIES Overdue 3/9/2016      Done 3/9/2015 Imm Admin: Pneumococcal Conjugate Vaccine (Prevnar/PCV-13)    IMM INFLUENZA Overdue 9/1/2018     MAMMOGRAM Next Due 3/26/2019      Done 3/26/2018 MA-MAMMO SCREENING BILAT W/TOMOSYNTHESIS W/CAD     Patient has more history with this topic...    BONE DENSITY Next Due 8/28/2023      Done 8/28/2018 DS-BONE DENSITY STUDY (DEXA)     Patient has more history with this topic...    COLONOSCOPY Next Due 1/21/2024      Done 1/21/2014 REFERRAL TO GI FOR COLONOSCOPY    IMM DTaP/Tdap/Td Vaccine Next Due 7/6/2026      Done 7/6/2016 Imm Admin: Tdap Vaccine          Patient Care Team:  Milton Rhodes M.D. as PCP - General (Family Medicine)    Social History   Substance Use Topics   • Smoking status: Never Smoker   • Smokeless tobacco: Never Used   • Alcohol  "use No     Family History   Problem Relation Age of Onset   • Cancer Mother         breast     She  has no past medical history on file.   Past Surgical History:   Procedure Laterality Date   • CHOLECYSTECTOMY     • LUMPECTOMY     • US-NEEDLE CORE BX-BREAST PANEL             Exam:     Blood pressure 106/66, pulse 69, temperature 36.4 °C (97.6 °F), resp. rate 16, height 1.676 m (5' 6\"), weight 55.2 kg (121 lb 11.1 oz), SpO2 97 %. Body mass index is 19.64 kg/m².    Hearing excellent.    Dentition good  Alert, oriented in no acute distress.  Eye contact is good, speech goal directed, affect calm      Assessment and Plan. The following treatment and monitoring plan is recommended:    1. Medicare annual wellness visit, subsequent  [place] ANNUAL WELLNESS VISIT-INCLUDES PPPS SUBSEQUE*   2. Need for pneumococcal vaccination  Pneumococal Polysaccharide Vaccine 23-Valent =>1yo SQ/IM    [place]I ANNUAL WELLNESS VISIT-INCLUDES PPPS SUBSEQUE*   3. Osteopenia determined by x-ray  WI ANNUAL WELLNESS VISIT-INCLUDES PPPS SUBSEQUE*   4. Pure hypercholesterolemia  LIPID PROFILE    WI ANNUAL WELLNESS VISIT-INCLUDES PPPS SUBSEQUE*   5. Encounter for lipid screening for cardiovascular disease  LIPID PROFILE    [place]I ANNUAL WELLNESS VISIT-INCLUDES PPPS SUBSEQUE*   6. Chest heaviness  [place]I ANNUAL WELLNESS VISIT-INCLUDES PPPS SUBSEQUE*   7. Need for vaccination  Zoster Vac Recomb Adjuvanted (SHINGRIX) 50 MCG Recon Susp    WI ANNUAL WELLNESS VISIT-INCLUDES PPPS SUBSEQUE*         Services suggested: No services needed at this time  Health Care Screening recommendations as per orders if indicated.  Referrals offered: PT/OT/Nutrition counseling/Behavioral Health/Smoking cessation as per orders if indicated.    Discussion today about general wellness and lifestyle habits:    · Prevent falls and reduce trip hazards; Cautioned about securing or removing rugs.  · Have a working fire alarm and carbon monoxide detector;   · Engage in regular physical activity and " social activities       Follow-up: Return in about 6 months (around 3/5/2019) for General Checkup.

## 2018-11-16 ENCOUNTER — OFFICE VISIT (OUTPATIENT)
Dept: MEDICAL GROUP | Facility: LAB | Age: 73
End: 2018-11-16
Payer: MEDICARE

## 2018-11-16 VITALS
SYSTOLIC BLOOD PRESSURE: 108 MMHG | WEIGHT: 121.2 LBS | HEART RATE: 78 BPM | RESPIRATION RATE: 18 BRPM | DIASTOLIC BLOOD PRESSURE: 66 MMHG | BODY MASS INDEX: 19.48 KG/M2 | OXYGEN SATURATION: 99 % | HEIGHT: 66 IN | TEMPERATURE: 97.8 F

## 2018-11-16 DIAGNOSIS — J01.00 ACUTE NON-RECURRENT MAXILLARY SINUSITIS: ICD-10-CM

## 2018-11-16 PROCEDURE — 99204 OFFICE O/P NEW MOD 45 MIN: CPT | Performed by: INTERNAL MEDICINE

## 2018-11-16 RX ORDER — DOXYCYCLINE HYCLATE 100 MG
100 TABLET ORAL 2 TIMES DAILY
Qty: 20 TAB | Refills: 0 | Status: SHIPPED | OUTPATIENT
Start: 2018-11-16 | End: 2018-11-16 | Stop reason: SDUPTHER

## 2018-11-16 RX ORDER — FLUTICASONE PROPIONATE 50 MCG
1 SPRAY, SUSPENSION (ML) NASAL DAILY
Qty: 16 G | Refills: 0 | Status: SHIPPED | OUTPATIENT
Start: 2018-11-16 | End: 2019-09-10

## 2018-11-16 RX ORDER — DOXYCYCLINE HYCLATE 100 MG
100 TABLET ORAL 2 TIMES DAILY
Qty: 20 TAB | Refills: 0
Start: 2018-11-16 | End: 2018-11-26

## 2018-11-16 NOTE — PROGRESS NOTES
Chief Complaint   Patient presents with   • Cough     x3 weeks       Subjective:     History of Present Illness: Patient is a 73 y.o. female. This pleasant patient of Dr. Rhodes who is here today for an acute visit.    Acute non-recurrent maxillary sinusitis  This is a new uncontrolled problem  She reported 3 weeks history of illness.  It started with a sore throat that continued for 1 week and then it improved after she received a Z-Pancho from her .  Later on, she had a bilateral maxillary sinus pressure sensation.  She described it as burning/10 in intensity, nonradiating.  Associated with a postnasal drip that is yellowish color.  She denied actual fever but she has subjective feeling of hotness and chills.  She did have bilateral ear aching or plugging sensation but no actual pain.  Later on she developed a productive cough with yellow sputum but not associated with shortness of breath or wheeze.  She tried over-the-counter Mucinex last time with minimal help, she also tried a codeine cough syrup from her  and she believes it gave her an allergic reaction.          Allergies: Clarithromycin; Penicillin v; and Phenolphthalein    Current Outpatient Prescriptions Ordered in [facility]   Medication Sig Dispense Refill   • doxycycline (VIBRAMYCIN) 100 MG Tab Take 1 Tab by mouth 2 times a day for 10 days. 20 Tab 0   • fluticasone (FLONASE) 50 MCG/ACT nasal spray Spray 1 Spray in nose every day. 16 g 0   • aspirin 81 MG EC tablet Take 81 mg by mouth every day.     • lansoprazole (PREVACID) 15 MG CAPSULE DELAYED RELEASE Take 15 mg by mouth every day.       No current [facility]-ordered facility-administered medications on file.        History reviewed. No pertinent past medical history.    Past Surgical History:   Procedure Laterality Date   • CHOLECYSTECTOMY     • LUMPECTOMY     • US-NEEDLE CORE BX-BREAST PANEL         Social History   Substance Use Topics   • Smoking status: Never Smoker   • Smokeless tobacco: Never  "Used   • Alcohol use No       Family History   Problem Relation Age of Onset   • Cancer Mother         breast       ROS:     - Constitutional: Negative for fever, chills, unexpected weight change, and fatigue/generalized weakness.     - HEENT: Per HPI.    - Respiratory: Per HPI.    - Cardiovascular: Negative for chest pain, palpitations, orthopnea, and bilateral lower extremity edema.     - Gastrointestinal: Negative for heartburn, nausea, vomiting, abdominal pain, hematochezia, melena, diarrhea, constipation, and greasy/foul-smelling stools.     - Genitourinary: Negative for dysuria, polyuria, hematuria, pyuria, urinary urgency, and urinary incontinence.    - Musculoskeletal: Negative for myalgias, back pain, and joint pain.     - Skin: Negative for rash, itching, cyanotic skin color change.     - Neurological: Negative for dizziness, tingling, tremors, focal sensory deficit, focal weakness and headaches.     - Endo/Heme/Allergies: Does not bruise/bleed easily.     - Psychiatric/Behavioral: Negative for depression, suicidal/homicidal ideation and memory loss.        - NOTE: All other systems reviewed and are negative, except as in HPI.       Physical Exam:     Blood pressure 108/66, pulse 78, temperature 36.6 °C (97.8 °F), temperature source Temporal, resp. rate 18, height 1.676 m (5' 6\"), weight 55 kg (121 lb 3.2 oz), last menstrual period 06/01/2006, SpO2 99 %, not currently breastfeeding. Body mass index is 19.56 kg/m².   General: Normal appearing. No distress.  HEENT: Normocephalic. Eyes conjunctiva clear lids without ptosis, pupils equal and reactive to light accommodation, ears normal shape and contour, canals are clear bilaterally, tympanic membranes are benign,  oropharynx is without erythema, edema or exudates.  Bilateral maxillary sinus tenderness to percussion.  Neck: Supple without JVD or bruit. Thyroid is not enlarged.  Pulmonary: Clear to ausculation.  Normal effort. No rales, ronchi, or " wheezing.  Cardiovascular: Regular rate and rhythm without murmur. Radial pulses are intact, regular and symmetrical bilaterally.  Abdomen: Soft, nontender, nondistended. Normal bowel sounds. Liver and spleen are not palpable.  Neurologic: Grossly non-focal.  Lymph: No cervical, occipital or supraclavicular lymph nodes are palpable  Skin: Warm and dry.  No obvious lesions.  Musculoskeletal: Normal gait. No extremity cyanosis, clubbing, or edema.  Psych: Normal mood and affect. Alert and oriented x3. Judgment and insight is normal.    Assessment and Plan:     1. Acute non-recurrent maxillary sinusitis  New uncontrolled problem.  As discussed in HPI, had a recurrent episode of after initial treatment with Z-Pancho on the 1/3 week.  Patient is known to be allergic to penicillins as well but has done well with tetracyclines in the past.  - We will proceed with doxycycline 100 mg twice daily for total of 10 days.  Advised to take with a full glass of water and stay upright for at least 30 minutes after taking this medication to also discussed potential photosensitivity.  - Also given another prescription for Flonase to help with seasonal sinusitis/allergic component.      Health Maintenance:      Completed  Health Maintenance Due   Topic   • IMM ZOSTER VACCINES (1 of 2) Unavailable        Follow Up:      Return for PRN with PCP.    Please note that this dictation was created using voice recognition software. I have made every reasonable attempt to correct obvious errors, but I expect that there are errors of grammar and possibly content that I did not discover before finalizing the note.    Signed by: Natalia Valadez M.D.

## 2018-11-16 NOTE — ASSESSMENT & PLAN NOTE
This is a new uncontrolled problem  She reported 3 weeks history of illness.  It started with a sore throat that continued for 1 week and then it improved after she received a Z-Pancho from her .  Later on, she had a bilateral maxillary sinus pressure sensation.  She described it as burning/10 in intensity, nonradiating.  Associated with a postnasal drip that is yellowish color.  She denied actual fever but she has subjective feeling of hotness and chills.  She did have bilateral ear aching or plugging sensation but no actual pain.  Later on she developed a productive cough with yellow sputum but not associated with shortness of breath or wheeze.  She tried over-the-counter Mucinex last time with minimal help, she also tried a codeine cough syrup from her  and she believes it gave her an allergic reaction.

## 2019-02-05 ENCOUNTER — HOSPITAL ENCOUNTER (OUTPATIENT)
Dept: LAB | Facility: MEDICAL CENTER | Age: 74
End: 2019-02-05
Attending: FAMILY MEDICINE
Payer: MEDICARE

## 2019-02-05 DIAGNOSIS — Z13.6 ENCOUNTER FOR LIPID SCREENING FOR CARDIOVASCULAR DISEASE: ICD-10-CM

## 2019-02-05 DIAGNOSIS — E78.00 PURE HYPERCHOLESTEROLEMIA: ICD-10-CM

## 2019-02-05 DIAGNOSIS — Z13.220 ENCOUNTER FOR LIPID SCREENING FOR CARDIOVASCULAR DISEASE: ICD-10-CM

## 2019-02-05 LAB
CHOLEST SERPL-MCNC: 197 MG/DL (ref 100–199)
FASTING STATUS PATIENT QL REPORTED: NORMAL
HDLC SERPL-MCNC: 65 MG/DL
LDLC SERPL CALC-MCNC: 116 MG/DL
TRIGL SERPL-MCNC: 78 MG/DL (ref 0–149)

## 2019-02-05 PROCEDURE — 80061 LIPID PANEL: CPT

## 2019-02-05 PROCEDURE — 36415 COLL VENOUS BLD VENIPUNCTURE: CPT

## 2019-02-28 ENCOUNTER — OFFICE VISIT (OUTPATIENT)
Dept: MEDICAL GROUP | Facility: MEDICAL CENTER | Age: 74
End: 2019-02-28
Payer: MEDICARE

## 2019-02-28 VITALS
OXYGEN SATURATION: 98 % | WEIGHT: 123.2 LBS | HEIGHT: 66 IN | DIASTOLIC BLOOD PRESSURE: 68 MMHG | SYSTOLIC BLOOD PRESSURE: 100 MMHG | HEART RATE: 88 BPM | BODY MASS INDEX: 19.8 KG/M2 | TEMPERATURE: 98.5 F

## 2019-02-28 DIAGNOSIS — E78.00 PURE HYPERCHOLESTEROLEMIA: ICD-10-CM

## 2019-02-28 DIAGNOSIS — I44.4 LEFT ANTERIOR FASCICULAR BLOCK: ICD-10-CM

## 2019-02-28 DIAGNOSIS — M85.80 OSTEOPENIA DETERMINED BY X-RAY: ICD-10-CM

## 2019-02-28 PROBLEM — J01.00 ACUTE NON-RECURRENT MAXILLARY SINUSITIS: Status: RESOLVED | Noted: 2018-11-16 | Resolved: 2019-02-28

## 2019-02-28 PROCEDURE — 99214 OFFICE O/P EST MOD 30 MIN: CPT | Performed by: FAMILY MEDICINE

## 2019-02-28 ASSESSMENT — PATIENT HEALTH QUESTIONNAIRE - PHQ9: CLINICAL INTERPRETATION OF PHQ2 SCORE: 0

## 2019-02-28 NOTE — PROGRESS NOTES
Subjective:   Chief Complaint/History of Present Illness:  Kim Lora is a 73 y.o. female established patient who presents today to discuss medical problems as listed below    Diagnoses of Pure hypercholesterolemia, Osteopenia determined by x-ray, and Left anterior fascicular block were pertinent to this visit.    Pure hypercholesterolemia  Patient and I discussed recent labs (see below; LDL elevation, improving but mildly uncontrolled).    Patient and I then discussed necessary dietary changes to make to address dyslipidemia.  Patient is NOT currently taking cholesterol lowering medication.  Patient verbalized understanding.    ROS is NEGATIVE for dizziness, generalized weakness/fatigue, vision/hearing changes, jaw pain/paresthesias, BUE pain/paresthesias/numbness/weakness, chest pain/pressure, palpitations, dyspnea, RUQ abdominal pain, oliguria/anuria, BLE edema.    Lab Results   Component Value Date/Time    CHOLSTRLTOT 197 02/05/2019 08:49 AM     (H) 02/05/2019 08:49 AM    HDL 65 02/05/2019 08:49 AM    TRIGLYCERIDE 78 02/05/2019 08:49 AM       Osteopenia determined by x-ray  Chronic, stable, well-controlled, taking vitamin D & Calcium as directed, and hikes multiple times per week (weight-bearing exercise).  Therefore, we will recheck bone density 2-3years from previous (08/2018).    Left anterior fascicular block  Asymptomatic.    ROS is NEGATIVE for dizziness, generalized weakness/fatigue, cold sweats,  vision/hearing changes, jaw pain/paresthesias, BUE pain/paresthesias/numbness/weakness, chest pain/pressure, palpitations, dyspnea, nausea, RUQ abdominal pain, oliguria/anuria, BLE edema.      Patient Active Problem List    Diagnosis Date Noted   • Osteopenia determined by x-ray 05/22/2018   • Pure hypercholesterolemia 02/26/2018   • H/O Mohs surgery for squamous cell carcinoma of skin 02/22/2018   • H/O Mohs surgery for basal cell carcinoma 02/22/2018   • H/O Sebastian-Barr virus infection  "02/22/2018   • Left anterior fascicular block 02/21/2018       Additional History:   Allergies:    Clarithromycin; Penicillin v; and Phenolphthalein     Current Medications:     Current Outpatient Prescriptions   Medication Sig Dispense Refill   • Calcium-Magnesium-Vitamin D 600- MG-MG-UNIT TABLET SR 24 HR Take 1 Tab by mouth 2 Times a Day.     • fluticasone (FLONASE) 50 MCG/ACT nasal spray Spray 1 Spray in nose every day. 16 g 0   • aspirin 81 MG EC tablet Take 81 mg by mouth every day.     • lansoprazole (PREVACID) 15 MG CAPSULE DELAYED RELEASE Take 15 mg by mouth every day.       No current facility-administered medications for this visit.         Social History:     Social History   Substance Use Topics   • Smoking status: Never Smoker   • Smokeless tobacco: Never Used   • Alcohol use 2.4 - 3.0 oz/week     4 - 5 Glasses of wine per week      Comment: White wine       ROS:     - NOTE: All other systems reviewed and are negative, except as in HPI.     Objective:   Physical Exam:    Vitals: Blood pressure 100/68, pulse 88, temperature 36.9 °C (98.5 °F), height 1.676 m (5' 5.98\"), weight 55.9 kg (123 lb 3.2 oz), last menstrual period 06/01/2006, SpO2 98 %, not currently breastfeeding.   BMI: Body mass index is 19.89 kg/m².   General/Constitutional: Vitals as above, Well nourished, well developed female in no acute distress   Head/Eyes: Head is grossly normal & atraumatic, bilateral conjunctivae clear and not injected, bilateral EOMI, bilateral PERRL   ENT: Bilateral external ears grossly normal in appearance, Hearing grossly intact, External nares normal in appearance and without discharge/bleeding   Respiratory: No respiratory distress, bilateral lungs are clear to ausculation in all lung fields (anterior/lateral/posterior), no wheezing/rhonchi/rales   Cardiovascular: Regular rate and rhythm without murmur/gallops/rubs, distal pulses are intact and equal bilaterally (radial, posterior tibial), no bilateral " lower extremity edema   MSK: Gait grossly normal & not antalgic   Integumentary: No apparent rashes   Psych: Judgment grossly appropriate, no apparent depression/anxiety    Health Maintenance:     - Mammogram in March    - 08/28/18 -- DEXA = Osteopenia    Imaging/Labs:     - 02/05/19 -- elevated LDL (116), improved, cholesterol o/w WNL    Assessment and Plan:   1. Pure hypercholesterolemia  Chronic, uncontrolled, patient advised to pursue lifestyle changes, particularly cardiovascular exercise and increasing proportion of plant-based nutrition.  Evaluate for Lp(a) abnormality, and risk of cardiovascular events with labs, as below.   - LIPOPROTEIN A; Future   - HOMOCYSTEINE; Future   - CRP HIGH SENSITIVE (CARDIAC); Future   - Lipid Profile; Future    2. Osteopenia determined by x-ray  Chronic, uncontrolled, patient advised to pursue lifestyle changes, particularly weight-bearing exercise and supplementation of vitamin D and calcium.   - Comp Metabolic Panel; Future   - Calcium-Magnesium-Vitamin D 600- MG-MG-UNIT TABLET SR 24 HR; Take 1 Tab by mouth 2 Times a Day.    3. Left anterior fascicular block  Chronic, stable, well-controlled.   - Comp Metabolic Panel; Future        RTC: In September 2019 for Medicare annual wellness visit.    PLEASE NOTE: This dictation was created using voice recognition software. I have made every reasonable attempt to correct obvious errors, but I expect that there are errors of grammar and possibly content that I did not discover before finalizing the note.

## 2019-02-28 NOTE — ASSESSMENT & PLAN NOTE
Asymptomatic.    ROS is NEGATIVE for dizziness, generalized weakness/fatigue, cold sweats,  vision/hearing changes, jaw pain/paresthesias, BUE pain/paresthesias/numbness/weakness, chest pain/pressure, palpitations, dyspnea, nausea, RUQ abdominal pain, oliguria/anuria, BLE edema.

## 2019-02-28 NOTE — ASSESSMENT & PLAN NOTE
Chronic, stable, well-controlled, taking vitamin D & Calcium as directed, and hikes multiple times per week (weight-bearing exercise).  Therefore, we will recheck bone density 2-3years from previous (08/2018).

## 2019-02-28 NOTE — ASSESSMENT & PLAN NOTE
Patient and I discussed recent labs (see below; LDL elevation, improving but mildly uncontrolled).    Patient and I then discussed necessary dietary changes to make to address dyslipidemia.  Patient is NOT currently taking cholesterol lowering medication.  Patient verbalized understanding.    ROS is NEGATIVE for dizziness, generalized weakness/fatigue, vision/hearing changes, jaw pain/paresthesias, BUE pain/paresthesias/numbness/weakness, chest pain/pressure, palpitations, dyspnea, RUQ abdominal pain, oliguria/anuria, BLE edema.    Lab Results   Component Value Date/Time    CHOLSTRLTOT 197 02/05/2019 08:49 AM     (H) 02/05/2019 08:49 AM    HDL 65 02/05/2019 08:49 AM    TRIGLYCERIDE 78 02/05/2019 08:49 AM

## 2019-03-28 ENCOUNTER — HOSPITAL ENCOUNTER (OUTPATIENT)
Dept: RADIOLOGY | Facility: MEDICAL CENTER | Age: 74
End: 2019-03-28
Attending: OBSTETRICS & GYNECOLOGY
Payer: MEDICARE

## 2019-03-28 DIAGNOSIS — Z12.31 VISIT FOR SCREENING MAMMOGRAM: ICD-10-CM

## 2019-03-28 PROCEDURE — 77063 BREAST TOMOSYNTHESIS BI: CPT

## 2019-05-07 ENCOUNTER — OFFICE VISIT (OUTPATIENT)
Dept: MEDICAL GROUP | Facility: MEDICAL CENTER | Age: 74
End: 2019-05-07
Payer: MEDICARE

## 2019-05-07 VITALS
WEIGHT: 130.6 LBS | OXYGEN SATURATION: 97 % | BODY MASS INDEX: 20.99 KG/M2 | TEMPERATURE: 99.1 F | DIASTOLIC BLOOD PRESSURE: 60 MMHG | SYSTOLIC BLOOD PRESSURE: 102 MMHG | HEART RATE: 74 BPM | HEIGHT: 66 IN

## 2019-05-07 DIAGNOSIS — S46.819A STRAIN OF TRAPEZIUS MUSCLE, UNSPECIFIED LATERALITY, INITIAL ENCOUNTER: ICD-10-CM
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
## 2019-05-07 DIAGNOSIS — S13.4XXA WHIPLASH INJURY TO NECK, INITIAL ENCOUNTER: ICD-10-CM

## 2019-05-07 PROCEDURE — 99214 OFFICE O/P EST MOD 30 MIN: CPT | Performed by: FAMILY MEDICINE
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
## 2019-05-13 ENCOUNTER — PATIENT MESSAGE (OUTPATIENT)
Dept: MEDICAL GROUP | Facility: MEDICAL CENTER | Age: 74
End: 2019-05-13

## 2019-05-13 DIAGNOSIS — S13.4XXA WHIPLASH INJURY TO NECK, INITIAL ENCOUNTER: ICD-10-CM

## 2019-05-13 DIAGNOSIS — S46.819A STRAIN OF TRAPEZIUS MUSCLE, UNSPECIFIED LATERALITY, INITIAL ENCOUNTER: ICD-10-CM

## 2019-05-15 NOTE — TELEPHONE ENCOUNTER
From: Kim Lora  To: Milton Rhodes M.D.  Sent: 5/13/2019 6:13 PM PDT  Subject: Procedure Question    I am seeing a physical therapist and things are better but I think a massage therapist in addition would help my back. Would you be ok with an additional prescription for massage. If not, I understand as I know some MD's do not believe in massage. Thanks, Elaina Healy

## 2019-08-06 ENCOUNTER — HOSPITAL ENCOUNTER (OUTPATIENT)
Dept: LAB | Facility: MEDICAL CENTER | Age: 74
End: 2019-08-06
Attending: NEUROLOGICAL SURGERY
Payer: MEDICARE

## 2019-08-06 LAB
BUN SERPL-MCNC: 13 MG/DL (ref 8–22)
CREAT SERPL-MCNC: 0.7 MG/DL (ref 0.5–1.4)

## 2019-08-06 PROCEDURE — 82565 ASSAY OF CREATININE: CPT

## 2019-08-06 PROCEDURE — 36415 COLL VENOUS BLD VENIPUNCTURE: CPT

## 2019-08-06 PROCEDURE — 84520 ASSAY OF UREA NITROGEN: CPT

## 2019-08-15 ENCOUNTER — HOSPITAL ENCOUNTER (OUTPATIENT)
Dept: RADIOLOGY | Facility: MEDICAL CENTER | Age: 74
End: 2019-08-15
Attending: NEUROLOGICAL SURGERY
Payer: MEDICARE

## 2019-08-15 DIAGNOSIS — I67.1 CEREBRAL ANEURYSM, NONRUPTURED: ICD-10-CM

## 2019-08-15 PROCEDURE — 700117 HCHG RX CONTRAST REV CODE 255: Performed by: NEUROLOGICAL SURGERY

## 2019-08-15 PROCEDURE — 70496 CT ANGIOGRAPHY HEAD: CPT

## 2019-08-15 PROCEDURE — 70498 CT ANGIOGRAPHY NECK: CPT

## 2019-08-15 RX ADMIN — IOHEXOL 80 ML: 350 INJECTION, SOLUTION INTRAVENOUS at 10:38

## 2019-09-04 ENCOUNTER — HOSPITAL ENCOUNTER (OUTPATIENT)
Dept: LAB | Facility: MEDICAL CENTER | Age: 74
End: 2019-09-04
Attending: FAMILY MEDICINE
Payer: MEDICARE

## 2019-09-04 DIAGNOSIS — E78.00 PURE HYPERCHOLESTEROLEMIA: ICD-10-CM

## 2019-09-04 DIAGNOSIS — M85.80 OSTEOPENIA DETERMINED BY X-RAY: ICD-10-CM

## 2019-09-04 DIAGNOSIS — I44.4 LEFT ANTERIOR FASCICULAR BLOCK: ICD-10-CM

## 2019-09-04 LAB
ALBUMIN SERPL BCP-MCNC: 4.7 G/DL (ref 3.2–4.9)
ALBUMIN/GLOB SERPL: 1.9 G/DL
ALP SERPL-CCNC: 74 U/L (ref 30–99)
ALT SERPL-CCNC: 17 U/L (ref 2–50)
ANION GAP SERPL CALC-SCNC: 6 MMOL/L (ref 0–11.9)
AST SERPL-CCNC: 24 U/L (ref 12–45)
BILIRUB SERPL-MCNC: 0.8 MG/DL (ref 0.1–1.5)
BUN SERPL-MCNC: 12 MG/DL (ref 8–22)
CALCIUM SERPL-MCNC: 9.6 MG/DL (ref 8.5–10.5)
CHLORIDE SERPL-SCNC: 105 MMOL/L (ref 96–112)
CHOLEST SERPL-MCNC: 220 MG/DL (ref 100–199)
CO2 SERPL-SCNC: 28 MMOL/L (ref 20–33)
CREAT SERPL-MCNC: 0.66 MG/DL (ref 0.5–1.4)
CRP SERPL HS-MCNC: 0.4 MG/L (ref 0–7.5)
FASTING STATUS PATIENT QL REPORTED: NORMAL
GLOBULIN SER CALC-MCNC: 2.5 G/DL (ref 1.9–3.5)
GLUCOSE SERPL-MCNC: 93 MG/DL (ref 65–99)
HCYS SERPL-SCNC: 6.46 UMOL/L
HDLC SERPL-MCNC: 72 MG/DL
LDLC SERPL CALC-MCNC: 134 MG/DL
POTASSIUM SERPL-SCNC: 4.3 MMOL/L (ref 3.6–5.5)
PROT SERPL-MCNC: 7.2 G/DL (ref 6–8.2)
SODIUM SERPL-SCNC: 139 MMOL/L (ref 135–145)
TRIGL SERPL-MCNC: 70 MG/DL (ref 0–149)

## 2019-09-04 PROCEDURE — 36415 COLL VENOUS BLD VENIPUNCTURE: CPT

## 2019-09-04 PROCEDURE — 83695 ASSAY OF LIPOPROTEIN(A): CPT

## 2019-09-04 PROCEDURE — 80061 LIPID PANEL: CPT

## 2019-09-04 PROCEDURE — 86141 C-REACTIVE PROTEIN HS: CPT

## 2019-09-04 PROCEDURE — 83090 ASSAY OF HOMOCYSTEINE: CPT

## 2019-09-04 PROCEDURE — 80053 COMPREHEN METABOLIC PANEL: CPT

## 2019-09-05 LAB — LPA SERPL-MCNC: 14 MG/DL

## 2019-09-09 PROCEDURE — 99358 PROLONG SERVICE W/O CONTACT: CPT | Performed by: INTERNAL MEDICINE

## 2019-09-10 ENCOUNTER — OFFICE VISIT (OUTPATIENT)
Dept: MEDICAL GROUP | Facility: MEDICAL CENTER | Age: 74
End: 2019-09-10
Payer: MEDICARE

## 2019-09-10 VITALS
SYSTOLIC BLOOD PRESSURE: 116 MMHG | BODY MASS INDEX: 19.93 KG/M2 | RESPIRATION RATE: 16 BRPM | HEIGHT: 66 IN | HEART RATE: 69 BPM | DIASTOLIC BLOOD PRESSURE: 60 MMHG | OXYGEN SATURATION: 96 % | WEIGHT: 124 LBS | TEMPERATURE: 98.6 F

## 2019-09-10 DIAGNOSIS — I72.5 BASILAR ARTERY ANEURYSM (HCC): ICD-10-CM

## 2019-09-10 DIAGNOSIS — E78.2 MIXED HYPERLIPIDEMIA: ICD-10-CM

## 2019-09-10 DIAGNOSIS — M81.0 AGE-RELATED OSTEOPOROSIS WITHOUT CURRENT PATHOLOGICAL FRACTURE: ICD-10-CM

## 2019-09-10 DIAGNOSIS — K22.2 SCHATZKI'S RING: ICD-10-CM

## 2019-09-10 PROCEDURE — 99214 OFFICE O/P EST MOD 30 MIN: CPT | Performed by: INTERNAL MEDICINE

## 2019-09-10 RX ORDER — ATORVASTATIN CALCIUM 10 MG/1
10 TABLET, FILM COATED ORAL EVERY EVENING
Qty: 30 TAB | Refills: 1 | Status: SHIPPED | OUTPATIENT
Start: 2019-09-10 | End: 2019-11-11

## 2019-09-10 RX ORDER — SUCRALFATE 1 G/1
TABLET ORAL
COMMUNITY
Start: 2019-09-09 | End: 2019-09-10

## 2019-09-10 NOTE — PATIENT INSTRUCTIONS
Atorvastatin tablets  What is this medicine?  ATORVASTATIN (a TORE va sta tin) is known as a HMG-CoA reductase inhibitor or 'statin'. It lowers the level of cholesterol and triglycerides in the blood. This drug may also reduce the risk of heart attack, stroke, or other health problems in patients with risk factors for heart disease. Diet and lifestyle changes are often used with this drug.  This medicine may be used for other purposes; ask your health care provider or pharmacist if you have questions.  COMMON BRAND NAME(S): Lipitor  What should I tell my health care provider before I take this medicine?  They need to know if you have any of these conditions:  -frequently drink alcoholic beverages  -history of stroke, TIA  -kidney disease  -liver disease  -muscle aches or weakness  -other medical condition  -an unusual or allergic reaction to atorvastatin, other medicines, foods, dyes, or preservatives  -pregnant or trying to get pregnant  -breast-feeding  How should I use this medicine?  Take this medicine by mouth with a glass of water. Follow the directions on the prescription label. You can take this medicine with or without food. Take your doses at regular intervals. Do not take your medicine more often than directed.  Talk to your pediatrician regarding the use of this medicine in children. While this drug may be prescribed for children as young as 10 years old for selected conditions, precautions do apply.  Overdosage: If you think you have taken too much of this medicine contact a poison control center or emergency room at once.  NOTE: This medicine is only for you. Do not share this medicine with others.  What if I miss a dose?  If you miss a dose, take it as soon as you can. If it is almost time for your next dose, take only that dose. Do not take double or extra doses.  What may interact with this medicine?  Do not take this medicine with any of the following medications:  -red yeast  rice  -telaprevir  -telithromycin  -voriconazole  This medicine may also interact with the following medications:  -alcohol  -antiviral medicines for HIV or AIDS  -boceprevir  -certain antibiotics like clarithromycin, erythromycin, troleandomycin  -certain medicines for cholesterol like fenofibrate or gemfibrozil  -cimetidine  -clarithromycin  -colchicine  -cyclosporine  -digoxin  -female hormones, like estrogens or progestins and birth control pills  -grapefruit juice  -medicines for fungal infections like fluconazole, itraconazole, ketoconazole  -niacin  -rifampin  -spironolactone  This list may not describe all possible interactions. Give your health care provider a list of all the medicines, herbs, non-prescription drugs, or dietary supplements you use. Also tell them if you smoke, drink alcohol, or use illegal drugs. Some items may interact with your medicine.  What should I watch for while using this medicine?  Visit your doctor or health care professional for regular check-ups. You may need regular tests to make sure your liver is working properly.  Tell your doctor or health care professional right away if you get any unexplained muscle pain, tenderness, or weakness, especially if you also have a fever and tiredness. Your doctor or health care professional may tell you to stop taking this medicine if you develop muscle problems. If your muscle problems do not go away after stopping this medicine, contact your health care professional.  This drug is only part of a total heart-health program. Your doctor or a dietician can suggest a low-cholesterol and low-fat diet to help. Avoid alcohol and smoking, and keep a proper exercise schedule.  Do not use this drug if you are pregnant or breast-feeding. Serious side effects to an unborn child or to an infant are possible. Talk to your doctor or pharmacist for more information.  This medicine may affect blood sugar levels. If you have diabetes, check with your doctor  or health care professional before you change your diet or the dose of your diabetic medicine.  If you are going to have surgery tell your health care professional that you are taking this drug.  What side effects may I notice from receiving this medicine?  Side effects that you should report to your doctor or health care professional as soon as possible:  -allergic reactions like skin rash, itching or hives, swelling of the face, lips, or tongue  -dark urine  -fever  -joint pain  -muscle cramps, pain  -redness, blistering, peeling or loosening of the skin, including inside the mouth  -trouble passing urine or change in the amount of urine  -unusually weak or tired  -yellowing of eyes or skin  Side effects that usually do not require medical attention (report to your doctor or health care professional if they continue or are bothersome):  -constipation  -heartburn  -stomach gas, pain, upset  This list may not describe all possible side effects. Call your doctor for medical advice about side effects. You may report side effects to FDA at 7-340-FDA-6295.  Where should I keep my medicine?  Keep out of the reach of children.  Store at room temperature between 20 to 25 degrees C (68 to 77 degrees F). Throw away any unused medicine after the expiration date.  NOTE: This sheet is a summary. It may not cover all possible information. If you have questions about this medicine, talk to your doctor, pharmacist, or health care provider.  © 2018 [employer] Standard (2012-11-06 09:18:24)

## 2019-09-11 NOTE — ASSESSMENT & PLAN NOTE
This is a chronic problem but requires discussion for pharmacotherapy.  During our visit I pulled up the statin decision aid provided by the [facility] to review her 10-year risk and a 100 women in her similar situation.  Over the next 10 years if she does not initiate statin therapy approximately 14 people in her situation will go on to have a heart attack and 86 people would have no heart attack.  If she were to start a standard potency statin than the number of people in her situation with a heart attack was decreased from 14 down to 11.  She would also notice a benefit with a decreased risk of stroke of 20%.  I discussed the common side effects of statin therapy including nausea, diarrhea, constipation as well as myalgias and hepatitis and the less common finding of rhabdomyolysis.  She is agreeable to starting atorvastatin 10 mg nightly with close follow-up for tolerance.  We will follow-up with her liver enzymes in approximately 12 weeks.

## 2019-09-11 NOTE — ASSESSMENT & PLAN NOTE
This is a new problem that requires additional discussion.  While in the room we accessed the osteoporosis decision aid on the [facility] website and I was able to visualize for her 100 patients in her same scenario when inputting the FRAX score.  68 women in her situation would not break a bone over the next 10 years however 32 would.  If we were to initiate intervention with bisphosphonate therapy and additional 13 women in her situation would avoid breaking a bone.  As such, I recommend initiation of antiresorptive therapy, however we need to wait until her upper endoscopy is completed next month to determine whether she would tolerate oral bisphosphonate or if we would need to go directly to IV infusion in the form of Reclast annually.  I did discuss the risk including mild side effects of heartburn, nausea, or abdominal pain.  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  We also talked about the one in 10,000 risk for an atypical femur fracture after greater than 5 years use which again would be unlikely due to her frequent resistance training and healthy lifestyle.  I also briefly mentioned bisphosphonate holiday therapy after 5 to 10 years on medicine.

## 2019-09-11 NOTE — ASSESSMENT & PLAN NOTE
This is a chronic and stable problem that has annual follow-up with CTA to evaluate growth of the aneurysm.  She will continue with the baby aspirin daily which she is tolerating without difficulty and follow-up with Dr. Luciano.

## 2019-09-11 NOTE — PROGRESS NOTES
Subjective:     CC:  Diagnoses of Basilar artery aneurysm (HCC), Mixed hyperlipidemia, Age-related osteoporosis without current pathological fracture, and Schatzki's ring were pertinent to this visit.    HISTORY OF THE PRESENT ILLNESS: Patient is a 74 y.o. female. This pleasant patient is here today to establish care and discuss the below stated chronic medical conditions. She is unaccompanied for this visit.    Problem   Schatzki's Ring    She reports previous difficulty with the feeling that food was getting stuck down her throat.  She had an upper endoscopy which showed Schatzki ring and she underwent a dilation with good results.  This was with Dr. Raymond James.  She has since [facility] and will have repeat upper and lower scopes in late October 2019.  In the meantime she is maintained on lansoprazole for ongoing GERD symptoms and dyspepsia.  She denies any history of food impaction or food bolus requiring urgent endoscopy.  She denies difficulty with swallowing liquids but does have some irritation with large pills or dry food.  She is eating a well-balanced diet high in fibers, fruits, and vegetables.  She does admit to one glass of white wine nightly.  No hematemesis or postprandial pain.  Bowel movements are normal without melena or hematochezia.  No abdominal distention or bloating.  No increased flatulence.     Age-Related Osteoporosis Without Current Pathological Fracture    She has osteoporosis by FRAX index and has never had a discussion of pharmacotherapy.  She is taking in calcium 1200 mg daily and vitamin D 2000 international units daily as well as a well-balanced diet.  She has not had a personal fracture.  Her mother did have a hip fracture following a fall from standing height and had a diagnosis of osteoporosis.  When calculating her 10-year fracture risk assessment score we find her risk of major osteoporotic fracture to be 32% and hip  fracture to be 24%.  Concerned about using oral bisphosphonates secondary to esophageal disease in the form of Schatzki rings requiring EGD with dilation.     Mixed Hyperlipidemia    Lab Results   Component Value Date/Time    CHOLSTRLTOT 220 (H) 09/04/2019 08:50 AM     (H) 09/04/2019 08:50 AM    HDL 72 09/04/2019 08:50 AM    TRIGLYCERIDE 70 09/04/2019 08:50 AM         Lab Results   Component Value Date/Time    ALKPHOSPHAT 74 09/04/2019 08:50 AM    ASTSGOT 24 09/04/2019 08:50 AM    ALTSGPT 17 09/04/2019 08:50 AM    TBILIRUBIN 0.8 09/04/2019 08:50 AM      She has history of elevated cholesterol and elevated LDL in the past despite following a very healthy and active lifestyle.  Fortunately, she does have a high HDL in the low 70s.  She has had no prior cardiac events.  She has never trialed statin therapy.  She did have a recent evaluation including homocystine, hsCRP, lipoprotein a which were all unremarkable.     Basilar Artery Aneurysm (Hcc)    CTA HEAD/NECK 01/08/2017  1.  Mild, less than 50%, atherosclerotic narrowing of the carotid bulbs and proximal internal carotid arteries bilaterally, slightly more apparent on the LEFT.  2.  No evidence for focal high-grade stenosis, dissection or occlusion of the cervical carotid or vertebral arteries.  1.  Small caliber basilar artery.  Prominence of the distal basilar artery concerning for 2 mm aneurysm.  2.  No abrupt vessel cut off to indicate occlusion.  3.  Persistent carotid origin of the posterior cerebral arteries, normal variant.     CTA HEAD/NECK 01/31/2018  1.  No change in mild to moderate atherosclerotic plaque in the aorta and carotid bulbs and proximal ICA bilaterally.  2.  No significant stenosis identified. Diameter reduction in each ICA is between 0% to 49%.  1.  Prominence of distal basilar artery is again noted concerning for 2 mm aneurysm. Findings are unchanged compared to the prior exam.  2.  No new abnormalities have developed since the prior  examination.    basilar artery aneurysm less than 2 mm diagnosed 1/2017 with symptoms of vertigo and balance problems seen by Dr. Spencer Luciano (neurosurgeon) on chronic aspirin (81mg) therapy which is unchanged based on a recent brain CTA on 1/31/2018.  She has no vertigo, dizziness, neck pain, head pain, or visual changes.         Allergies: Clarithromycin; Penicillin v; and Phenolphthalein    Current Outpatient Medications Ordered in Epic   Medication Sig Dispense Refill   • atorvastatin (LIPITOR) 10 MG Tab Take 1 Tab by mouth every evening. 30 Tab 1   • Calcium-Magnesium-Vitamin D 600- MG-MG-UNIT TABLET SR 24 HR Take 1 Tab by mouth 2 Times a Day.     • aspirin 81 MG EC tablet Take 81 mg by mouth every day.     • lansoprazole (PREVACID) 15 MG CAPSULE DELAYED RELEASE Take 15 mg by mouth every day.       No current [facility]-ordered facility-administered medications on file.        Past Medical History:   Diagnosis Date   • Basilar artery aneurysm (HCC)    • GERD (gastroesophageal reflux disease)    • Hyperlipidemia    • Schatzki's ring        Past Surgical History:   Procedure Laterality Date   • CHOLECYSTECTOMY     • LUMPECTOMY     • US-NEEDLE CORE BX-BREAST PANEL         Social History     Tobacco Use   • Smoking status: Never Smoker   • Smokeless tobacco: Never Used   Substance Use Topics   • Alcohol use: Yes     Alcohol/week: 2.4 - 3.0 oz     Types: 4 - 5 Glasses of wine per week     Comment: White wine   • Drug use: No       Social History     Social History Narrative    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 She has a daughter in town who is an OB/GYN. She follows a very active lifestyle including hiking, resistance training, and weight training. She eats a well balanced diet. She does not use a gait aid.       Family History   Problem Relation Age of Onset   • Cancer Mother         breast   • Heart Disease Father        Health Maintenance: Completed    ROS:   Obtained and  "negative except for what is noted in the HPI  Objective:     Exam: /60 (BP Location: Left arm, Patient Position: Sitting, BP Cuff Size: Adult)   Pulse 69   Temp 37 °C (98.6 °F) (Temporal)   Resp 16   Ht 1.676 m (5' 5.98\")   Wt 56.2 kg (124 lb)   SpO2 96%  Body mass index is 20.03 kg/m².    General: Normal appearing, healthy, appears younger than stated age. No distress. Appears stated age.  EENT: Eyes conjunctiva clear lids without ptosis, extraocular movements intact, ears normal shape and contour, canals with mild cerumen on left side and clear on right side, tympanic membranes are benign, oropharynx is with scant erythema due to post nasal drip; no pharyngeal edema or exudates. Excellent dentition.   Neck: Supple without JVD. Thyroid is not enlarged.  Pulmonary: Clear to ausculation.  Normal effort. No rales, ronchi, or wheezing. No cough.  Cardiovascular: Regular rate and rhythm without murmur. No lower extremity edema bilaterally.  Abdomen: Soft, nontender, nondistended. Normal bowel sounds.   Neurologic: Active ROM grossly normal in large muscle groups in bilateral upper and lower extremities.  Lymph: No cervical or supraclavicular lymph nodes are palpable  Skin: Warm and dry.  No obvious lesions on skin exposed areas.  Musculoskeletal: Normal gait. No extremity cyanosis, clubbing, or edema. Patient ambulates independently.   Psych: Normal mood and affect. Alert and oriented x3. Judgment and insight is normal.    Labs/Imaging: We reviewed her recent lipid and CMP. We also reviewed her most recent bone density.    Assessment & Plan:   74 y.o. female with the following -    1. Schatzki's ring  This is a chronic and worsening problem.  She is scheduled for dilation of her Schatzki ring recurrence in late October 2019 with Dr. Cassidy.  She will continue PPI therapy in the meantime and understands that this may deplete her magnesium and vitamin D.  She has been advised to increase her vitamin D and take a " multivitamin that contains magnesium.  We will hold off on bisphosphonate therapy as described below until we know the status of her esophagus.    2. Age-related osteoporosis without current pathological fracture  This is a new problem that requires additional discussion.  While in the room we accessed the osteoporosis decision aid on the [facility] website and I was able to visualize for her 100 patients in her same scenario when inputting the FRAX score.  68 women in her situation would not break a bone over the next 10 years however 32 would.  If we were to initiate intervention with bisphosphonate therapy and additional 13 women in her situation would avoid breaking a bone.  As such, I recommend initiation of antiresorptive therapy, however we need to wait until her upper endoscopy is completed next month to determine whether she would tolerate oral bisphosphonate or if we would need to go directly to IV infusion in the form of Reclast annually.  I did discuss the risk including mild side effects of heartburn, nausea, or abdominal pain.  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  We also talked about the one in 10,000 risk for an atypical femur fracture after greater than 5 years use which again would be unlikely due to her frequent resistance training and healthy lifestyle.  I also briefly mentioned bisphosphonate holiday therapy after 5 to 10 years on medicine.    3. Mixed hyperlipidemia  This is a chronic problem but requires discussion for pharmacotherapy.  During our visit I pulled up the statin decision aid provided by the [facility] to review her 10-year risk and a 100 women in her similar situation.  Over the next 10 years if she does not initiate statin therapy approximately 14 people in her situation will go on to have a heart attack and 86 people would  have no heart attack.  If she were to start a standard potency statin than the number of people in her situation with a heart attack was decreased from 14 down to 11.  She would also notice a benefit with a decreased risk of stroke of 20%.  I discussed the common side effects of statin therapy including nausea, diarrhea, constipation as well as myalgias and hepatitis and the less common finding of rhabdomyolysis.  She is agreeable to starting atorvastatin 10 mg nightly with close follow-up for tolerance.  We will follow-up with her liver enzymes in approximately 12 weeks.  - atorvastatin (LIPITOR) 10 MG Tab; Take 1 Tab by mouth every evening.  Dispense: 30 Tab; Refill: 1    4. Basilar artery aneurysm (HCC)  This is a chronic and stable problem that has annual follow-up with CTA to evaluate growth of the aneurysm.  She will continue with the baby aspirin daily which she is tolerating without difficulty and follow-up with Dr. Luciano.     Greater than 60 minutes was spent providing face-to-face counseling with the patient regarding statin medication initiation and bisphosphonate medication initiation including the risks, benefits, and alternatives. Face to face time: 15:45-16:45. Additional chart review and coordination of care from 17:00-17:30 regarding prior cardiology and gastroenterology procedures and notes.    Return in about 2 months (around 11/10/2019).    Please note that this dictation was created using voice recognition software. I have made every reasonable attempt to correct obvious errors, but I expect that there are errors of grammar and possibly content that I did not discover before finalizing the note.

## 2019-09-11 NOTE — ASSESSMENT & PLAN NOTE
This is a chronic and worsening problem.  She is scheduled for dilation of her Schatzki ring recurrence in late October 2019 with Dr. Cassidy.  She will continue PPI therapy in the meantime and understands that this may deplete her magnesium and vitamin D.  She has been advised to increase her vitamin D and take a multivitamin that contains magnesium.  We will hold off on bisphosphonate therapy as described below until we know the status of her esophagus.

## 2019-10-09 ENCOUNTER — HOSPITAL ENCOUNTER (OUTPATIENT)
Dept: LAB | Facility: MEDICAL CENTER | Age: 74
End: 2019-10-09
Attending: INTERNAL MEDICINE
Payer: MEDICARE

## 2019-10-09 LAB
25(OH)D3 SERPL-MCNC: 27 NG/ML (ref 30–100)
MAGNESIUM SERPL-MCNC: 2.2 MG/DL (ref 1.5–2.5)

## 2019-10-09 PROCEDURE — 82607 VITAMIN B-12: CPT

## 2019-10-09 PROCEDURE — 82306 VITAMIN D 25 HYDROXY: CPT

## 2019-10-09 PROCEDURE — 36415 COLL VENOUS BLD VENIPUNCTURE: CPT

## 2019-10-09 PROCEDURE — 82746 ASSAY OF FOLIC ACID SERUM: CPT

## 2019-10-09 PROCEDURE — 83735 ASSAY OF MAGNESIUM: CPT

## 2019-10-10 LAB
FOLATE SERPL-MCNC: 17.2 NG/ML
VIT B12 SERPL-MCNC: 686 PG/ML (ref 211–911)

## 2019-11-11 ENCOUNTER — OFFICE VISIT (OUTPATIENT)
Dept: MEDICAL GROUP | Facility: MEDICAL CENTER | Age: 74
End: 2019-11-11
Payer: MEDICARE

## 2019-11-11 VITALS
HEART RATE: 81 BPM | BODY MASS INDEX: 20.31 KG/M2 | TEMPERATURE: 98.6 F | DIASTOLIC BLOOD PRESSURE: 60 MMHG | WEIGHT: 121.91 LBS | HEIGHT: 65 IN | SYSTOLIC BLOOD PRESSURE: 102 MMHG | OXYGEN SATURATION: 96 %

## 2019-11-11 DIAGNOSIS — E78.2 MIXED HYPERLIPIDEMIA: ICD-10-CM

## 2019-11-11 DIAGNOSIS — M81.0 AGE-RELATED OSTEOPOROSIS WITHOUT CURRENT PATHOLOGICAL FRACTURE: ICD-10-CM

## 2019-11-11 DIAGNOSIS — I72.5 BASILAR ARTERY ANEURYSM (HCC): ICD-10-CM

## 2019-11-11 DIAGNOSIS — K22.2 SCHATZKI'S RING: ICD-10-CM

## 2019-11-11 PROCEDURE — 99214 OFFICE O/P EST MOD 30 MIN: CPT | Performed by: INTERNAL MEDICINE

## 2019-11-11 RX ORDER — ROSUVASTATIN CALCIUM 5 MG/1
5 TABLET, COATED ORAL EVERY EVENING
Qty: 30 TAB | Refills: 11 | Status: SHIPPED
Start: 2019-11-11 | End: 2020-05-07

## 2019-11-11 RX ORDER — MAGNESIUM GLUCONATE 27 MG(500)
500 TABLET ORAL 3 TIMES DAILY
COMMUNITY

## 2019-11-11 RX ORDER — IBUPROFEN 200 MG
500 CAPSULE ORAL DAILY
COMMUNITY
End: 2023-05-04

## 2019-11-11 NOTE — PATIENT INSTRUCTIONS
1. Take some Tylenol the day of your Reclast (zoledronic acid) infusion to help with some of the muscle/bone aches that can happen after the infusion.

## 2019-11-11 NOTE — PROGRESS NOTES
Subjective:   Chief Complaint/History of Present Illness:  Kim Healy is a 74 y.o. female established patient who presents today to discuss medical problems as listed below. She is unaccompanied for today's visit.    Problem   Schatzki's Ring    EGD (10/23/19): received pathology report from Dr. Cassidy- evelyn Howard, no eosinophilic esophagitis, normal biopsies; colonoscopy recall in 5 years (10/23/2024) due to family history of colon cancer.    She reports previous difficulty with the feeling that food was getting stuck down her throat.  She had an upper endoscopy which showed Schatzki ring and she underwent a dilation with good results.  This was with Dr. Raymond James.  She has since established with [facility] and will have repeat upper and lower scopes in late October 2019.  In the meantime she is maintained on lansoprazole for ongoing GERD symptoms and dyspepsia.  She denies any history of food impaction or food bolus requiring urgent endoscopy.  She denies difficulty with swallowing liquids but does have some irritation with large pills or dry food.  She is eating a well-balanced diet high in fibers, fruits, and vegetables.  She does admit to one glass of white wine nightly.  No hematemesis or postprandial pain.  Bowel movements are normal without melena or hematochezia.  No abdominal distention or bloating.  No increased flatulence.     Age-Related Osteoporosis Without Current Pathological Fracture    Bone density (8/2018):  FINDINGS:  The mean bone mineral density for the lumbar spine is 0.968 g/cm2, which corresponds to a T score of -1.8 and a Z score of 0.3.     The proximal left femur has a mean bone mineral density of 0.808 g/cm2, with a T score of -1.6 and a Z score of 0.3.     When compared with the most recent study dated 8/18/2011, there has been a 4.4% increase in the bone mineral density of the lumbar spine and a 4.5% decrease in the bone mineral density of  the left femur.     IMPRESSION:     According to the World Health Organization classification, bone mineral density of this patient is osteopenic.     10-year Probability of Fracture:  Major Osteoporotic     23.5%  Hip     10.9%  Population      [place] ()       She has osteoporosis by FRAX index and has never had a discussion of pharmacotherapy.  She is taking in calcium 1200 mg daily and vitamin D 4000 international units daily as well as a well-balanced diet.  She has not had a personal fracture.  Her mother did have a hip fracture following a fall from standing height and had a diagnosis of osteoporosis.  When calculating her 10-year fracture risk assessment score we find her risk of major osteoporotic fracture to be 32% and hip fracture to be 24%.  Concerned about using oral bisphosphonates secondary to esophageal disease in the form of Schatzki rings requiring EGD with dilation. She would be open to trialing Reclast infusion.     Mixed Hyperlipidemia    Lab Results   Component Value Date/Time    CHOLSTRLTOT 220 (H) 09/04/2019 08:50 AM     (H) 09/04/2019 08:50 AM    HDL 72 09/04/2019 08:50 AM    TRIGLYCERIDE 70 09/04/2019 08:50 AM         Lab Results   Component Value Date/Time    ALKPHOSPHAT 74 09/04/2019 08:50 AM    ASTSGOT 24 09/04/2019 08:50 AM    ALTSGPT 17 09/04/2019 08:50 AM    TBILIRUBIN 0.8 09/04/2019 08:50 AM      She has history of elevated cholesterol and elevated LDL in the past despite following a very healthy and active lifestyle.  Fortunately, she does have a high HDL in the low 70s.  She has had no prior cardiac events.  She has never trialed statin therapy.  She did have a recent evaluation including homocystine, hsCRP, lipoprotein a which were all unremarkable.  We trialed low-dose atorvastatin in September when I first met her unfortunate this led to esophageal irritation.  She would be willing to try a different statin such as rosuvastatin.     Basilar Artery Aneurysm (Hcc)     "8/2019 CTA head:  2 mm aneurysm tip of the basilar artery appears unchanged.  No occlusions or significant stenosis [place] arteries.    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 Spencer Luciano (neurosurgeon) on chronic aspirin (81mg) therapy which is unchanged based on a recent brain CTA on 1/31/2018.  She has no vertigo, dizziness, neck pain, head pain, or visual changes.  We have also initiated her on statin medication.          Additional History:   Allergies:    Clarithromycin; Penicillin v; and Phenolphthalein     Current Medications:     Current Outpatient Medications   Medication Sig Dispense Refill   • calcium carbonate (OS-PAUL 500) 1250 (500 Ca) MG Tab Take 500 mg by mouth every day.     • vitamin D (CHOLECALCIFEROL) 1000 UNIT Tab Take 1,000 Units by mouth every day. Taking 4000 a day     • magnesium gluconate (MAG-G) 500 MG tablet Take 500 mg by mouth 3 times a day.     • rosuvastatin (CRESTOR) 5 MG Tab Take 1 Tab by mouth every evening. 30 Tab 11   • aspirin 81 MG EC tablet Take 81 mg by mouth every day.     • lansoprazole (PREVACID) 15 MG CAPSULE DELAYED RELEASE Take 15 mg by mouth every day.     • Calcium-Magnesium-Vitamin D 600- MG-MG-UNIT TABLET SR 24 HR Take 1 Tab by mouth 2 Times a Day.       No current facility-administered medications for this visit.         Social History:     Social History     Tobacco Use   • Smoking status: Never Smoker   • Smokeless tobacco: Never Used   Substance Use Topics   • Alcohol use: Yes     Alcohol/week: 2.4 - 3.0 oz     Types: 4 - 5 Glasses of wine per week     Comment: White wine   • Drug use: No       ROS: As above in the HPI      Objective:   Physical Exam:    Vitals: /60 (BP Location: Left arm, Patient Position: Sitting, BP Cuff Size: Adult)   Pulse 81   Temp 37 °C (98.6 °F) (Temporal)   Ht 1.66 m (5' 5.34\")   Wt 55.3 kg (121 lb 14.6 oz)   SpO2 96%    BMI: " Body mass index is 20.08 kg/m².   General/Constitutional: Vitals as above, Well nourished, well developed female in no acute distress   Eyes: Bilateral conjunctivae clear and not injected, bilateral EOMI, bilateral PERRLA   ENT: Bilateral external ears grossly normal in appearance, Hearing grossly intact, External nares normal in appearance and without discharge/bleeding   Respiratory: No respiratory distress, bilateral lungs are clear to ausculation in all lung fields, no wheezing/rhonchi, no cough   Cardiovascular: Regular rate and rhythm without murmur/rubs, distal pulses are intact and equal bilaterally, no bilateral lower extremity edema   MSK: Gait grossly normal & not antalgic   Integumentary: No apparent rashes on skin exposed areas.   Psych: Judgment grossly appropriate, no apparent depression/anxiety    Health Maintenance:     - Completed      Assessment and Plan:     Problem List Items Addressed This Visit     Schanainaki's ring     Chronic and ongoing problem.  Most recent esophageal dilation was in October 2019 by Dr. [facility].  She has follow-up with their group next month and in the meantime plan to continue on Prevacid 50 mg daily.  We had a discussion about oral bisphosphonates for her osteoporosis and due to her history of esophageal disease we have opted to go with IV Reclast infusion.  No other changes today.         Age-related osteoporosis without current pathological fracture     This is a new problem that requires initiation of pharmacotherapy.  I reiterated that she should be taking vitamin D at least 2000 international units daily as well as calcium 1200 mg daily.  She is open to starting a medication for bone resorption.  Due to significant esophageal disease she would not be a candidate for oral bisphosphonates.  She does have normal kidney function and would be open to trying Reclast.  We will order this infusion at our infusion center, she has normal calcium and creatinine  in September 2019.  I advised her to take some Tylenol the day of the infusion as she can have bone aching and myalgias/flulike illness after this infusion.  She voiced understanding.         Relevant Orders    REFERRAL TO OUTPATIENT INFUSION    Mixed hyperlipidemia     Chronic problem that requires additional medication titration.  She did not tolerate rate the atorvastatin 10 mg daily due to esophagitis.  We will instead trial rosuvastatin 5 mg daily to see if she has any improvement in her symptoms.  To let me know how she is feeling and if she does not tolerate this and we may need to consider ezetimibe.  Next lipid panel due around September 2020.         Relevant Medications    rosuvastatin (CRESTOR) 5 MG Tab    Basilar artery aneurysm (HCC)     Chronic and stable problem.  We will continue her on baby aspirin and statin medication in the form of rosuvastatin 5 mg daily.  She will continue to follow-up with Dr. Luciano of neurosurgery.  Latest imaging in October 2019 was stable.         Relevant Medications    rosuvastatin (CRESTOR) 5 MG Tab         RTC: 1 year.    PLEASE NOTE: This dictation was created using voice recognition software. I have made every reasonable attempt to correct obvious errors, but I expect that there are errors of grammar and possibly content that I did not discover before finalizing the note.

## 2019-11-12 NOTE — ASSESSMENT & PLAN NOTE
Chronic and ongoing problem.  Most recent esophageal dilation was in October 2019 by Dr. [facility].  She has follow-up with their group next month and in the meantime plan to continue on Prevacid 50 mg daily.  We had a discussion about oral bisphosphonates for her osteoporosis and due to her history of esophageal disease we have opted to go with IV Reclast infusion.  No other changes today.

## 2019-11-12 NOTE — ASSESSMENT & PLAN NOTE
Chronic problem that requires additional medication titration.  She did not tolerate rate the atorvastatin 10 mg daily due to esophagitis.  We will instead trial rosuvastatin 5 mg daily to see if she has any improvement in her symptoms.  To let me know how she is feeling and if she does not tolerate this and we may need to consider ezetimibe.  Next lipid panel due around September 2020.

## 2019-11-12 NOTE — ASSESSMENT & PLAN NOTE
This is a new problem that requires initiation of pharmacotherapy.  I reiterated that she should be taking vitamin D at least 2000 international units daily as well as calcium 1200 mg daily.  She is open to starting a medication for bone resorption.  Due to significant esophageal disease she would not be a candidate for oral bisphosphonates.  She does have normal kidney function and would be open to trying Reclast.  We will order this infusion at our infusion center, she has normal calcium and creatinine in September 2019.  I advised her to take some Tylenol the day of the infusion as she can have bone aching and myalgias/flulike illness after this infusion.  She voiced understanding.

## 2019-11-12 NOTE — ASSESSMENT & PLAN NOTE
Chronic and stable problem.  We will continue her on baby aspirin and statin medication in the form of rosuvastatin 5 mg daily.  She will continue to follow-up with Dr. Luciano of neurosurgery.  Latest imaging in October 2019 was stable.

## 2020-02-21 ENCOUNTER — APPOINTMENT (OUTPATIENT)
Dept: ONCOLOGY | Facility: MEDICAL CENTER | Age: 75
End: 2020-02-21
Attending: INTERNAL MEDICINE
Payer: MEDICARE

## 2020-03-11 ENCOUNTER — OFFICE VISIT (OUTPATIENT)
Dept: MEDICAL GROUP | Facility: MEDICAL CENTER | Age: 75
End: 2020-03-11
Payer: MEDICARE

## 2020-03-11 VITALS
WEIGHT: 121.03 LBS | TEMPERATURE: 97.3 F | OXYGEN SATURATION: 97 % | HEIGHT: 65 IN | DIASTOLIC BLOOD PRESSURE: 64 MMHG | RESPIRATION RATE: 14 BRPM | HEART RATE: 86 BPM | BODY MASS INDEX: 20.17 KG/M2 | SYSTOLIC BLOOD PRESSURE: 100 MMHG

## 2020-03-11 DIAGNOSIS — I72.5 BASILAR ARTERY ANEURYSM (HCC): ICD-10-CM

## 2020-03-11 DIAGNOSIS — M81.0 AGE-RELATED OSTEOPOROSIS WITHOUT CURRENT PATHOLOGICAL FRACTURE: ICD-10-CM

## 2020-03-11 DIAGNOSIS — E78.2 MIXED HYPERLIPIDEMIA: ICD-10-CM

## 2020-03-11 DIAGNOSIS — Z00.00 MEDICARE ANNUAL WELLNESS VISIT, SUBSEQUENT: Primary | ICD-10-CM

## 2020-03-11 DIAGNOSIS — E55.9 VITAMIN D DEFICIENCY: ICD-10-CM

## 2020-03-11 PROCEDURE — G0439 PPPS, SUBSEQ VISIT: HCPCS | Performed by: INTERNAL MEDICINE

## 2020-03-11 ASSESSMENT — ACTIVITIES OF DAILY LIVING (ADL): BATHING_REQUIRES_ASSISTANCE: 0

## 2020-03-11 ASSESSMENT — PATIENT HEALTH QUESTIONNAIRE - PHQ9: CLINICAL INTERPRETATION OF PHQ2 SCORE: 0

## 2020-03-11 ASSESSMENT — ENCOUNTER SYMPTOMS: GENERAL WELL-BEING: EXCELLENT
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
## 2020-03-11 NOTE — LETTER
March 11, 2020        Kim Healy  [address] [place] [address]        To whom it may concern:    I am the physician for Kim Healy and declare that she is in good health and safe to travel [place].    If you have any questions or concerns, please don't hesitate to call.        Sincerely,        Jo-Ann Whiteside D.O.    Electronically Signed

## 2020-03-11 NOTE — PROGRESS NOTES
Chief Complaint   Patient presents with   • Annual Exam     HPI:  Kim Healy is a 74 y.o. here for Medicare Annual Wellness Visit     Problem   Vitamin D Deficiency       Ref. Range 10/9/2019 08:11   25-Hydroxy   Vitamin D 25 Latest Ref Range: 30 - 100 ng/mL 27 (L)       She was initiated on cholecalciferol 5000 international units after she was found to be deficient with her vitamin D.  She has a coexisting diagnosis of osteoporosis.     Age-Related Osteoporosis Without Current Pathological Fracture    Bone Density (2/2020):  Lumbar spine T score -2.5  Left femoral neck T score -1.9  Right femoral neck T score -2.2    Bone density (8/2018):  lumbar spine T score of -1.8   proximal left femur T score of -1.6      She has osteoporosis by FRAX index and has never had a discussion of pharmacotherapy.  She is taking in calcium 1200 mg daily and vitamin D 5000 international units daily as well as a well-balanced diet.  She has not had a personal fracture.  Her mother did have a hip fracture following a fall from standing height and had a diagnosis of osteoporosis.  She has a history of Schatzki ring and is hesitant to try oral bisphosphonates which is reasonable.  I suggested IV Reclast but after reading about potential side effects she has declined.  She would like to focus on a more naturopathic approach to treating her osteoporosis.  I did review her updated bone density which showed that she has declined in her T-scores in the spine and now meets criteria for osteoporosis.     Mixed Hyperlipidemia       Ref. Range 9/4/2019 08:50   Cholesterol,Tot Latest Ref Range: 100 - 199 mg/dL 220 (H)   Triglycerides Latest Ref Range: 0 - 149 mg/dL 70   HDL Latest Ref Range: >=40 mg/dL 72   LDL Latest Ref Range: <100 mg/dL 134 (H)     She has history of elevated cholesterol and elevated LDL in the past despite following a very healthy and active lifestyle.  Fortunately, she does have a high HDL in the low 70s.  She has  had no prior cardiac events.     Low-dose atorvastatin trialed in September but this led to esophageal irritation.  She is doing well on rosuvastatin 5 mg daily, questions mild brain fog at time and wonders if it could be related.     Basilar Artery Aneurysm (Hcc)    CTA head (8/2019):  2 mm aneurysm tip of the basilar artery appears unchanged.  No occlusions or significant stenosis [place] arteries.    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 Spencer Luciano (neurosurgeon) on chronic aspirin (81mg) therapy which is unchanged based on a recent brain CTA on 1/31/2018.  She has no vertigo, dizziness, neck pain, head pain, or visual changes.  She is on statin therapy.            Current Outpatient Medications   Medication Sig Dispense Refill   • calcium carbonate (OS-PAUL 500) 1250 (500 Ca) MG Tab Take 500 mg by mouth every day.     • vitamin D (CHOLECALCIFEROL) 1000 UNIT Tab Take 1,000 Units by mouth every day. Taking 4000 a day     • magnesium gluconate (MAG-G) 500 MG tablet Take 500 mg by mouth 3 times a day.     • rosuvastatin (CRESTOR) 5 MG Tab Take 1 Tab by mouth every evening. 30 Tab 11   • Calcium-Magnesium-Vitamin D 600- MG-MG-UNIT TABLET SR 24 HR Take 1 Tab by mouth 2 Times a Day.     • aspirin 81 MG EC tablet Take 81 mg by mouth every day.     • lansoprazole (PREVACID) 15 MG CAPSULE DELAYED RELEASE Take 15 mg by mouth every day.       No current facility-administered medications for this visit.             Current supplements as per medication list.       Allergies: Clarithromycin; Penicillin v; and Phenolphthalein    Current social contact/activities:     She  reports that she has never smoked. She has never used smokeless tobacco. She reports current alcohol use of about 2.4 - 3.0 oz of alcohol per week. She reports that she does not use drugs.  Counseling given: Not Answered  Comment: abstinence      DPA/Advanced  Directive:  Patient has Living Will, but it is not on file. Instructed to bring in a copy to scan into their chart.    ROS:    Gait: Uses no assistive device  Ostomy: No  Other tubes: No  Amputations: No  Chronic oxygen use: No  Last eye exam: December 2019  Wears hearing aids: No   : Reports urinary leakage during the last 6 months that has not interfered at all with their daily activities or sleep. Notices occasionally while hiking, wears a light pad. Not bothered by symptoms.    Screening:    Depression Screening    Little interest or pleasure in doing things?  0 - not at all  Feeling down, depressed , or hopeless? 0 - not at all  Patient Health Questionnaire Score: 0     If depressive symptoms identified deferred to follow up visit unless specifically addressed in assessment and plan.    Interpretation of PHQ-9 Total Score   Score Severity   1-4 No Depression   5-9 Mild Depression   10-14 Moderate Depression   15-19 Moderately Severe Depression   20-27 Severe Depression    Screening for Cognitive Impairment    Three Minute Recall (village, kitchen, baby)  /3    Giovanny clock face with all 12 numbers and set the hands to show 10 past 10.  No    Cognitive concerns identified deferred for follow up unless specifically addressed in assessment and plan.    Fall Risk Assessment    Has the patient had two or more falls in the last year or any fall with injury in the last year?  No    Safety Assessment    Throw rugs on floor.  Yes  Handrails on all stairs.  Yes  Good lighting in all hallways.  Yes  Difficulty hearing.  No  Patient counseled about all safety risks that were identified.    Functional Assessment ADLs    Are there any barriers preventing you from cooking for yourself or meeting nutritional needs?  No.    Are there any barriers preventing you from driving safely or obtaining transportation?  No.    Are there any barriers preventing you from using a telephone or calling for help?  No.    Are there any barriers  preventing you from shopping?  No.    Are there any barriers preventing you from taking care of your own finances?  No.    Are there any barriers preventing you from managing your medications?  No.    Are there any barriers preventing you from showering, bathing or dressing yourself?  No.    Are you currently engaging in any exercise or physical activity?  Yes.     What is your perception of your health?  Excellent.      Health Maintenance Summary                Annual Wellness Visit Overdue 9/6/2019      Done 9/5/2018 Visit Dx: Medicare annual wellness visit, subsequent     Patient has more history with this topic...    IMM ZOSTER VACCINES Next Due 3/20/2020      Done 1/24/2020 Ext Imm: Shingrix Recombinant Dose    MAMMOGRAM Next Due 3/28/2020      Done 3/28/2019 MA-SCREENING MAMMO BILAT W/TOMOSYNTHESIS W/CAD     Patient has more history with this topic...    BONE DENSITY Next Due 8/28/2023      Done 8/28/2018 DS-BONE DENSITY STUDY (DEXA)     Patient has more history with this topic...    COLONOSCOPY Next Due 10/23/2024      Done 10/23/2019 REFERRAL TO GI FOR COLONOSCOPY     Patient has more history with this topic...    IMM DTaP/Tdap/Td Vaccine Next Due 7/6/2026      Done 7/6/2016 Imm Admin: Tdap Vaccine          Patient Care Team:  Jo-Ann Whiteside D.O. as PCP - General (Internal Medicine)  James Godoy M.D. as Consulting Physician (OB/Gyn)        Social History     Tobacco Use   • Smoking status: Never Smoker   • Smokeless tobacco: Never Used   • Tobacco comment: abstinence   Substance Use Topics   • Alcohol use: Yes     Alcohol/week: 2.4 - 3.0 oz     Types: 4 - 5 Glasses of wine per week     Comment: White wine   • Drug use: No     Family History   Problem Relation Age of Onset   • Cancer Mother         breast   • Heart Disease Father      She  has a past medical history of Basilar artery aneurysm (HCC), GERD (gastroesophageal reflux disease), Hyperlipidemia, Osteoporosis, and Schatzki's ring. She also  "has no past medical history of Encounter for long-term (current) use of other medications.   Past Surgical History:   Procedure Laterality Date   • CHOLECYSTECTOMY     • LUMPECTOMY     • US-NEEDLE CORE BX-BREAST PANEL         Exam:   /64 (BP Location: Left arm, Patient Position: Sitting, BP Cuff Size: Adult)   Pulse 86   Temp 36.3 °C (97.3 °F) (Temporal)   Resp 14   Ht 1.66 m (5' 5.34\")   Wt 54.9 kg (121 lb 0.5 oz)   SpO2 97%  Body mass index is 19.93 kg/m².    Hearing excellent.    Dentition good  Alert, oriented in no acute distress.  Eye contact is good, speech goal directed, affect calm    Assessment and Plan:     Problem List Items Addressed This Visit     Age-related osteoporosis without current pathological fracture     Chronic and worsening problem.  Her lumbar spine is deteriorated and now meets criteria for osteoporosis, her hip T score has also declined.  He currently declines antiresorptive therapy as she is worried about side effects.  We discussed IV Reclast and Prolia but she is worried about potential dental implications.  Cannot do oral bisphosphonates because her history of Schatzki ring.  She would not be a candidate for Forteo or Tymlos.  She wants to pursue a naturopathic approach or perhaps to meet with [facility] [place] to see if there are any other newer recommendations.  I told her that my above treatment recommendations are standard of care and that she will be high risk of fracture off pharmacotherapy and she voiced understanding.         Relevant Orders    Subsequent Annual Wellness Visit - Includes PPPS () (Completed)    CBC WITH DIFFERENTIAL    Mixed hyperlipidemia    Relevant Orders    Subsequent Annual Wellness Visit - Includes PPPS () (Completed)    Lipid Profile    CBC WITH DIFFERENTIAL    Comp Metabolic Panel    Basilar artery aneurysm (HCC)     Chronic and stable problem.  Continue on baby aspirin and statin therapy.  Continue follow-up with Dr. Luciano of " neurosurgery.  Imaging stable in Fall 2019.         Relevant Orders    Subsequent Annual Wellness Visit - Includes PPPS () (Completed)    Vitamin D deficiency     Chronic and ongoing problem.  Check vitamin D level and continue supplementation at current dose in the meantime.         Relevant Orders    Subsequent Annual Wellness Visit - Includes PPPS () (Completed)    Comp Metabolic Panel    VITAMIN D,25 HYDROXY      Other Visit Diagnoses     Medicare annual wellness visit, subsequent    -  Primary         Services suggested: No services needed at this time  Health Care Screening: Age-appropriate preventive services recommended by USPTF and ACIP covered by Medicare were discussed today. Services ordered if indicated and agreed upon by the patient.  Referrals offered: Community-based lifestyle interventions to reduce health risks and promote self-management and wellness, fall prevention, nutrition, physical activity, tobacco-use cessation, weight loss, and mental health services as per orders if indicated.    Discussion today about general wellness and lifestyle habits:    · Prevent falls and reduce trip hazards; Cautioned about securing or removing rugs.  · Have a working fire alarm and carbon monoxide detector;   · Engage in regular physical activity and social activities     Follow-up: Return in about 6 months (around 9/11/2020).

## 2020-03-11 NOTE — ASSESSMENT & PLAN NOTE
Chronic and ongoing problem.  Check vitamin D level and continue supplementation at current dose in the meantime.

## 2020-03-11 NOTE — ASSESSMENT & PLAN NOTE
Chronic and worsening problem.  Her lumbar spine is deteriorated and now meets criteria for osteoporosis, her hip T score has also declined.  He currently declines antiresorptive therapy as she is worried about side effects.  We discussed IV Reclast and Prolia but she is worried about potential dental implications.  Cannot do oral bisphosphonates because her history of Schatzki ring.  She would not be a candidate for Forteo or Tymlos.  She wants to pursue a naturopathic approach or perhaps to meet with [facility] [place] to see if there are any other newer recommendations.  I told her that my above treatment recommendations are standard of care and that she will be high risk of fracture off pharmacotherapy and she voiced understanding.

## 2020-03-11 NOTE — ASSESSMENT & PLAN NOTE
Chronic and stable problem.  Continue on baby aspirin and statin therapy.  Continue follow-up with Dr. Luciano of neurosurgery.  Imaging stable in Fall 2019.

## 2020-05-07 ENCOUNTER — OFFICE VISIT (OUTPATIENT)
Dept: MEDICAL GROUP | Facility: LAB | Age: 75
End: 2020-05-07
Payer: MEDICARE

## 2020-05-07 VITALS
SYSTOLIC BLOOD PRESSURE: 118 MMHG | HEART RATE: 83 BPM | OXYGEN SATURATION: 97 % | RESPIRATION RATE: 16 BRPM | BODY MASS INDEX: 20.33 KG/M2 | DIASTOLIC BLOOD PRESSURE: 64 MMHG | TEMPERATURE: 97.5 F | HEIGHT: 65 IN | WEIGHT: 122 LBS

## 2020-05-07 DIAGNOSIS — E55.9 VITAMIN D DEFICIENCY: ICD-10-CM

## 2020-05-07 DIAGNOSIS — D72.819 LEUKOPENIA, UNSPECIFIED TYPE: ICD-10-CM

## 2020-05-07 DIAGNOSIS — M81.0 AGE-RELATED OSTEOPOROSIS WITHOUT CURRENT PATHOLOGICAL FRACTURE: ICD-10-CM

## 2020-05-07 DIAGNOSIS — I72.5 BASILAR ARTERY ANEURYSM (HCC): ICD-10-CM

## 2020-05-07 DIAGNOSIS — E78.2 MIXED HYPERLIPIDEMIA: ICD-10-CM

## 2020-05-07 DIAGNOSIS — Z13.1 SCREENING FOR DIABETES MELLITUS: ICD-10-CM

## 2020-05-07 PROBLEM — S46.819A TRAPEZIUS MUSCLE STRAIN: Status: RESOLVED | Noted: 2019-05-07 | Resolved: 2020-05-07
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
## 2020-05-07 PROBLEM — S13.4XXA WHIPLASH INJURIES: Status: RESOLVED | Noted: 2019-05-07 | Resolved: 2020-05-07

## 2020-05-07 PROCEDURE — 99214 OFFICE O/P EST MOD 30 MIN: CPT | Performed by: FAMILY MEDICINE

## 2020-05-07 NOTE — ASSESSMENT & PLAN NOTE
She got severe heartburn with atorvastatin  The 10-year ASCVD risk score (Rajiv FOSS Jr., et al., 2013) is: 14%

## 2020-05-07 NOTE — PATIENT INSTRUCTIONS
Mediterranean Diet  A Mediterranean diet refers to food and lifestyle choices that are based on the traditions of countries located on the [place]. This way of eating has been shown to help prevent certain conditions and improve outcomes for people who have chronic diseases, like kidney disease and heart disease.  What are tips for following this plan?  Lifestyle  · Cook and eat meals together with your family, when possible.  · Drink enough fluid to keep your urine clear or pale yellow.  · Be physically active every day. This includes:  ¨ Aerobic exercise like running or swimming.  ¨ Leisure activities like gardening, walking, or housework.  · Get 7-8 hours of sleep each night.  · If recommended by your health care provider, drink red wine in moderation. This means 1 glass a day for nonpregnant women and 2 glasses a day for men. A glass of wine equals 5 oz (150 mL).  Reading food labels  · Check the serving size of packaged foods. For foods such as rice and pasta, the serving size refers to the amount of cooked product, not dry.  · Check the total fat in packaged foods. Avoid foods that have saturated fat or trans fats.  · Check the ingredients list for added sugars, such as corn syrup.  Shopping  · At the grocery store, buy most of your food from the areas near the walls of the store. This includes:  ¨ Fresh fruits and vegetables (produce).  ¨ Grains, beans, nuts, and seeds. Some of these may be available in unpackaged forms or large amounts (in bulk).  ¨ Fresh seafood.  ¨ Poultry and eggs.  ¨ Low-fat dairy products.  · Buy whole ingredients instead of prepackaged foods.  · Buy fresh fruits and vegetables in-season from local farmers markets.  · Buy frozen fruits and vegetables in resealable bags.  · If you do not have access to quality fresh seafood, buy precooked frozen shrimp or canned fish, such as tuna, salmon, or sardines.  · Buy small amounts of raw or cooked vegetables, salads, or olives from the  deli or salad bar at your store.  · Stock your pantry so you always have certain foods on hand, such as olive oil, canned tuna, canned tomatoes, rice, pasta, and beans.  Cooking  · Cook foods with extra-virgin olive oil instead of using butter or other vegetable oils.  · Have meat as a side dish, and have vegetables or grains as your main dish. This means having meat in small portions or adding small amounts of meat to foods like pasta or stew.  · Use beans or vegetables instead of meat in common dishes like chili or lasagna.  · [place] with different cooking methods. Try roasting or broiling vegetables instead of steaming or sautéeing them.  · Add frozen vegetables to soups, stews, pasta, or rice.  · Add nuts or seeds for added healthy fat at each meal. You can add these to yogurt, salads, or vegetable dishes.  · Marinate fish or vegetables using olive oil, lemon juice, garlic, and fresh herbs.  Meal planning  · Plan to eat 1 vegetarian meal one day each week. Try to work up to 2 vegetarian meals, if possible.  · Eat seafood 2 or more times a week.  · Have healthy snacks readily available, such as:  ¨ Vegetable sticks with hummus.  ¨ Greek yogurt.  ¨ Fruit and nut trail mix.  · Eat balanced meals throughout the week. This includes:  ¨ Fruit: 2-3 servings a day  ¨ Vegetables: 4-5 servings a day  ¨ Low-fat dairy: 2 servings a day  ¨ Fish, poultry, or lean meat: 1 serving a day  ¨ Beans and legumes: 2 or more servings a week  ¨ Nuts and seeds: 1-2 servings a day  ¨ Whole grains: 6-8 servings a day  ¨ Extra-virgin olive oil: 3-4 servings a day  · Limit red meat and sweets to only a few servings a month  What are my food choices?  · Mediterranean diet  ¨ Recommended  ¨ Grains: Whole-grain pasta. Brown rice. Bulgar wheat. Polenta. Couscous. Whole-wheat bread. Oatmeal. Quinoa.  ¨ Vegetables: Artichokes. Beets. Broccoli. Cabbage. Carrots. Eggplant. Green beans. Chard. Kale. Spinach. Onions. Leeks. Peas. Squash.  Tomatoes. Peppers. Radishes.  ¨ Fruits: Apples. Apricots. Avocado. Berries. Bananas. Cherries. Dates. Figs. Grapes. Kevin. Melon. Oranges. Peaches. Plums. Pomegranate.  ¨ Meats and other protein foods: Beans. Almonds. Sunflower seeds. Pine nuts. Peanuts. Cod. [place]. Scallops. Shrimp. Tuna. Tilapia. Clams. Oysters. Eggs.  ¨ Dairy: Low-fat milk. Cheese. Greek yogurt.  ¨ Beverages: Water. Red wine. Herbal tea.  ¨ Fats and oils: Extra virgin olive oil. Avocado oil. Grape seed oil.  ¨ Sweets and desserts: Greek yogurt with honey. Baked apples. Poached pears. Trail mix.  ¨ Seasoning and other foods: Basil. Cilantro. Coriander. Cumin. Mint. Parsley. Shade. Rosemary. Tarragon. Garlic. Oregano. Thyme. Pepper. Balsalmic vinegar. Tahini. Hummus. Tomato sauce. Olives. Mushrooms.  ¨ Limit these  ¨ Grains: Prepackaged pasta or rice dishes. Prepackaged cereal with added sugar.  ¨ Vegetables: Deep fried potatoes (french fries).  ¨ Fruits: Fruit canned in syrup.  ¨ Meats and other protein foods: Beef. Pork. Lamb. Poultry with skin. Hot dogs. [place].  ¨ Dairy: Ice cream. Sour cream. Whole milk.  ¨ Beverages: Juice. Sugar-sweetened soft drinks. Beer. Liquor and spirits.  ¨ Fats and oils: Butter. Canola oil. Vegetable oil. Beef fat (tallow). Lard.  ¨ Sweets and desserts: Cookies. Cakes. Pies. Candy.  ¨ Seasoning and other foods: Mayonnaise. Premade sauces and marinades.  ¨ The items listed may not be a complete list. Talk with your dietitian about what dietary choices are right for you.  Summary  · The Mediterranean diet includes both food and lifestyle choices.  · Eat a variety of fresh fruits and vegetables, beans, nuts, seeds, and whole grains.  · Limit the amount of red meat and sweets that you eat.  · Talk with your health care provider about whether it is safe for you to drink red wine in moderation. This means 1 glass a day for nonpregnant women and 2 glasses a day for men. A glass of wine equals 5 oz (150 mL).  This information  is not intended to replace advice given to you by your health care provider. Make sure you discuss any questions you have with your health care provider.  Document Released: 08/10/2017 Document Revised: 09/12/2017 Document Reviewed: 08/10/2017  Elsevier Interactive Patient Education © 2017 [employer]

## 2020-05-14 NOTE — ASSESSMENT & PLAN NOTE
Bone Density (2/2020):  Lumbar spine T score -2.5  Left femoral neck T score -1.9  Right femoral neck T score -2.2    Bone density (8/2018):  lumbar spine T score of -1.8   proximal left femur T score of -1.6      She has osteoporosis by FRAX index and has never had a discussion of pharmacotherapy.  She is taking in calcium 1200 mg daily and vitamin D 5000 international units daily as well as a well-balanced diet.  She has not had a personal fracture.  Her mother did have a hip fracture following a fall from standing height and had a diagnosis of osteoporosis.  She has a history of Schatzki ring and is hesitant to try oral bisphosphonates which is reasonable.  I suggested IV Reclast but after reading about potential side effects she has declined.  She would like to focus on a more naturopathic approach to treating her osteoporosis.  I did review her updated bone density which showed that she has declined in her T-scores in the spine and now meets criteria for osteoporosis.

## 2020-05-14 NOTE — ASSESSMENT & PLAN NOTE
2 mm aneurysm tip of the basilar artery appears unchanged.  No occlusions or significant stenosis [place] arteries.    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 Spencer Luciano (neurosurgeon) on chronic aspirin (81mg) therapy which is unchanged based on a recent brain CTA on 1/31/2018.  She has no vertigo, dizziness, neck pain, head pain, or visual changes.  She is no longer on statin therapy.

## 2020-05-14 NOTE — PROGRESS NOTES
Chief Complaint   Patient presents with   • Establish Care         Kim Healy is a 74 y.o. female here to establish care and for evaluation and management of:        HPI:    Mixed hyperlipidemia  She got severe heartburn with atorvastatin  The 10-year ASCVD risk score (Rajivallison FOSS Jr., et al., 2013) is: 14%      Age-related osteoporosis without current pathological fracture  Bone Density (2/2020):  Lumbar spine T score -2.5  Left femoral neck T score -1.9  Right femoral neck T score -2.2    Bone density (8/2018):  lumbar spine T score of -1.8   proximal left femur T score of -1.6      She has osteoporosis by FRAX index and has never had a discussion of pharmacotherapy.  She is taking in calcium 1200 mg daily and vitamin D 5000 international units daily as well as a well-balanced diet.  She has not had a personal fracture.  Her mother did have a hip fracture following a fall from standing height and had a diagnosis of osteoporosis.  She has a history of Schatzki ring and is hesitant to try oral bisphosphonates which is reasonable.  I suggested IV Reclast but after reading about potential side effects she has declined.  She would like to focus on a more naturopathic approach to treating her osteoporosis.  I did review her updated bone density which showed that she has declined in her T-scores in the spine and now meets criteria for osteoporosis.    Basilar artery aneurysm (HCC)  2 mm aneurysm tip of the basilar artery appears unchanged.  No occlusions or significant stenosis [place] arteries.    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 Spencer Luciano (neurosurgeon) on chronic aspirin (81mg) therapy which is unchanged based on a recent brain CTA on 1/31/2018.  She has no vertigo, dizziness, neck pain, head pain, or visual changes.  She is no longer on statin therapy.      Allergies   Allergen Reactions   • Clarithromycin  Swelling     Facial swelling   • Penicillin V Swelling     Facial Swelling   • Phenolphthalein Unspecified     Per patient, reaction as a child.       Current medicines (including changes today)  Current Outpatient Medications   Medication Sig Dispense Refill   • calcium carbonate (OS-PAUL 500) 1250 (500 Ca) MG Tab Take 500 mg by mouth every day.     • vitamin D (CHOLECALCIFEROL) 1000 UNIT Tab Take 1,000 Units by mouth every day. Taking 4000 a day     • magnesium gluconate (MAG-G) 500 MG tablet Take 500 mg by mouth 3 times a day.     • Calcium-Magnesium-Vitamin D 600- MG-MG-UNIT TABLET SR 24 HR Take 1 Tab by mouth 2 Times a Day.     • aspirin 81 MG EC tablet Take 81 mg by mouth every day.     • lansoprazole (PREVACID) 15 MG CAPSULE DELAYED RELEASE Take 15 mg by mouth every day.       No current facility-administered medications for this visit.      She  has a past medical history of Basilar artery aneurysm (HCC), GERD (gastroesophageal reflux disease), Hyperlipidemia, Osteoporosis, and Schatzki's ring. She also has no past medical history of Encounter for long-term (current) use of other medications.  She  has a past surgical history that includes us-needle core bx-breast panel; cholecystectomy; and lumpectomy.  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 She has a daughter in town who is an OB/GYN. She follows a very active lifestyle including hiking, resistance training, and weight training. She eats a well balanced diet. She does not use a gait aid.     Family History   Problem Relation Age of Onset   • Cancer Mother         breast   • Heart  "Disease Father      Family Status   Relation Name Status   • Mo   at age 95   • Fa   at age 56         ROS  No fever or chills.  No nausea or vomiting.  No chest pain or palpitations.  No cough or SOB.  No pain with urination or hematuria.  No black or bloody stools.  All other systems reviewed and are negative     Objective:     /64 (BP Location: Right arm, Patient Position: Sitting, BP Cuff Size: Adult)   Pulse 83   Temp 36.4 °C (97.5 °F) (Temporal)   Resp 16   Ht 1.66 m (5' 5.34\")   Wt 55.3 kg (122 lb)   SpO2 97%  Body mass index is 20.09 kg/m².  Physical Exam:      Well developed, well nourished.  Alert, oriented in no acute distress.  Psych: Eye contact is good, speech goal directed, affect calm  Eyes: conjunctiva non-injected, sclera non-icteric.  Ears: Pinna normal. TM pearly gray.   Nose: Nares are patent.  Normal mucosa  Mouth: Oral mucous membranes pink and moist with no lesions.  Neck Supple.  No adenopathy or masses in the neck or supraclavicular regions. No thyromegaly  Lungs: clear to auscultation bilaterally with good excursion. No wheezes or rhonchi  CV: regular rate and rhythm. No murmur  Abdomen: soft, nontender, no masses or organomegaly.  No rebound or guarding  Ext: no edema, color normal, vascularity normal, temperature normal      Assessment and Plan:   The following treatment plan was discussed    1. Basilar artery aneurysm (HCC)  Continue annual follow-up with neurosurgeon    2. Age-related osteoporosis without current pathological fracture  Stressed the importance of treatment.  Discussed doing Reclast or Prolia.  Patient refuses at this time    3. Mixed hyperlipidemia  Recheck labs.  Discussed increased risk of cardiovascular disease and increase ten-year risk score.  Again recommend restarting Crestor.  Patient refuses at this time.  Recheck labs  - Comp Metabolic Panel; Future  - Lipid Profile; Future  - TSH; Future    4. Vitamin D deficiency  Check vitamin D " level and adjust supplementation  - VITAMIN D,25 HYDROXY; Future    5. Leukopenia, unspecified type  Check CBC  - CBC WITH DIFFERENTIAL; Future    6. Screening for diabetes mellitus  Check chemistry panel  - Comp Metabolic Panel; Future      Records reviewed    Any change or worsening of signs or symptoms, patient encouraged to follow-up or report to the emergency room for further evaluation. Patient understands and agrees.    Followup: Return in about 1 year (around 5/7/2021).

## 2020-05-18 ENCOUNTER — HOSPITAL ENCOUNTER (OUTPATIENT)
Dept: LAB | Facility: MEDICAL CENTER | Age: 75
End: 2020-05-18
Attending: FAMILY MEDICINE
Payer: MEDICARE

## 2020-05-18 DIAGNOSIS — E55.9 VITAMIN D DEFICIENCY: ICD-10-CM

## 2020-05-18 DIAGNOSIS — Z13.1 SCREENING FOR DIABETES MELLITUS: ICD-10-CM

## 2020-05-18 DIAGNOSIS — D72.819 LEUKOPENIA, UNSPECIFIED TYPE: ICD-10-CM

## 2020-05-18 DIAGNOSIS — E78.2 MIXED HYPERLIPIDEMIA: ICD-10-CM

## 2020-05-18 LAB
25(OH)D3 SERPL-MCNC: 58 NG/ML (ref 30–100)
ALBUMIN SERPL BCP-MCNC: 4.6 G/DL (ref 3.2–4.9)
ALBUMIN/GLOB SERPL: 2.1 G/DL
ALP SERPL-CCNC: 72 U/L (ref 30–99)
ALT SERPL-CCNC: 15 U/L (ref 2–50)
ANION GAP SERPL CALC-SCNC: 12 MMOL/L (ref 7–16)
AST SERPL-CCNC: 22 U/L (ref 12–45)
BASOPHILS # BLD AUTO: 1.8 % (ref 0–1.8)
BASOPHILS # BLD: 0.07 K/UL (ref 0–0.12)
BILIRUB SERPL-MCNC: 0.5 MG/DL (ref 0.1–1.5)
BUN SERPL-MCNC: 11 MG/DL (ref 8–22)
CALCIUM SERPL-MCNC: 9.1 MG/DL (ref 8.5–10.5)
CHLORIDE SERPL-SCNC: 102 MMOL/L (ref 96–112)
CHOLEST SERPL-MCNC: 195 MG/DL (ref 100–199)
CO2 SERPL-SCNC: 27 MMOL/L (ref 20–33)
CREAT SERPL-MCNC: 0.65 MG/DL (ref 0.5–1.4)
EOSINOPHIL # BLD AUTO: 0.11 K/UL (ref 0–0.51)
EOSINOPHIL NFR BLD: 2.8 % (ref 0–6.9)
ERYTHROCYTE [DISTWIDTH] IN BLOOD BY AUTOMATED COUNT: 46.6 FL (ref 35.9–50)
FASTING STATUS PATIENT QL REPORTED: NORMAL
GLOBULIN SER CALC-MCNC: 2.2 G/DL (ref 1.9–3.5)
GLUCOSE SERPL-MCNC: 100 MG/DL (ref 65–99)
HCT VFR BLD AUTO: 45.5 % (ref 37–47)
HDLC SERPL-MCNC: 76 MG/DL
HGB BLD-MCNC: 14.7 G/DL (ref 12–16)
IMM GRANULOCYTES # BLD AUTO: 0.01 K/UL (ref 0–0.11)
IMM GRANULOCYTES NFR BLD AUTO: 0.3 % (ref 0–0.9)
LDLC SERPL CALC-MCNC: 104 MG/DL
LYMPHOCYTES # BLD AUTO: 1.31 K/UL (ref 1–4.8)
LYMPHOCYTES NFR BLD: 32.8 % (ref 22–41)
MCH RBC QN AUTO: 30.9 PG (ref 27–33)
MCHC RBC AUTO-ENTMCNC: 32.3 G/DL (ref 33.6–35)
MCV RBC AUTO: 95.8 FL (ref 81.4–97.8)
MONOCYTES # BLD AUTO: 0.45 K/UL (ref 0–0.85)
MONOCYTES NFR BLD AUTO: 11.3 % (ref 0–13.4)
NEUTROPHILS # BLD AUTO: 2.05 K/UL (ref 2–7.15)
NEUTROPHILS NFR BLD: 51 % (ref 44–72)
NRBC # BLD AUTO: 0 K/UL
NRBC BLD-RTO: 0 /100 WBC
PLATELET # BLD AUTO: 296 K/UL (ref 164–446)
PMV BLD AUTO: 9.6 FL (ref 9–12.9)
POTASSIUM SERPL-SCNC: 4.1 MMOL/L (ref 3.6–5.5)
PROT SERPL-MCNC: 6.8 G/DL (ref 6–8.2)
RBC # BLD AUTO: 4.75 M/UL (ref 4.2–5.4)
SODIUM SERPL-SCNC: 141 MMOL/L (ref 135–145)
TRIGL SERPL-MCNC: 77 MG/DL (ref 0–149)
TSH SERPL DL<=0.005 MIU/L-ACNC: 1.99 UIU/ML (ref 0.38–5.33)
WBC # BLD AUTO: 4 K/UL (ref 4.8–10.8)

## 2020-05-18 PROCEDURE — 36415 COLL VENOUS BLD VENIPUNCTURE: CPT

## 2020-05-18 PROCEDURE — 84443 ASSAY THYROID STIM HORMONE: CPT

## 2020-05-18 PROCEDURE — 80061 LIPID PANEL: CPT

## 2020-05-18 PROCEDURE — 85025 COMPLETE CBC W/AUTO DIFF WBC: CPT

## 2020-05-18 PROCEDURE — 82306 VITAMIN D 25 HYDROXY: CPT

## 2020-05-18 PROCEDURE — 80053 COMPREHEN METABOLIC PANEL: CPT

## 2020-05-20 ENCOUNTER — HOSPITAL ENCOUNTER (OUTPATIENT)
Dept: RADIOLOGY | Facility: MEDICAL CENTER | Age: 75
End: 2020-05-20
Attending: OBSTETRICS & GYNECOLOGY
Payer: MEDICARE

## 2020-05-20 DIAGNOSIS — Z12.31 VISIT FOR SCREENING MAMMOGRAM: ICD-10-CM

## 2020-05-20 PROCEDURE — 77067 SCR MAMMO BI INCL CAD: CPT

## 2020-07-16 ENCOUNTER — PATIENT MESSAGE (OUTPATIENT)
Dept: MEDICAL GROUP | Facility: LAB | Age: 75
End: 2020-07-16

## 2020-07-16 DIAGNOSIS — Z03.818 ENCOUNTER FOR OBSERVATION FOR SUSPECTED EXPOSURE TO OTHER BIOLOGICAL AGENTS RULED OUT: ICD-10-CM

## 2021-01-11 DIAGNOSIS — Z23 NEED FOR VACCINATION: ICD-10-CM

## 2021-01-24 ENCOUNTER — PATIENT MESSAGE (OUTPATIENT)
Dept: MEDICAL GROUP | Facility: LAB | Age: 76
End: 2021-01-24

## 2021-06-10 ENCOUNTER — PATIENT MESSAGE (OUTPATIENT)
Dept: MEDICAL GROUP | Facility: LAB | Age: 76
End: 2021-06-10

## 2021-06-10 DIAGNOSIS — Z13.1 SCREENING FOR DIABETES MELLITUS: ICD-10-CM

## 2021-06-10 DIAGNOSIS — E55.9 VITAMIN D DEFICIENCY: ICD-10-CM

## 2021-06-10 DIAGNOSIS — R73.01 ELEVATED FASTING GLUCOSE: ICD-10-CM

## 2021-06-10 DIAGNOSIS — D72.819 LEUKOPENIA, UNSPECIFIED TYPE: ICD-10-CM

## 2021-06-10 DIAGNOSIS — E78.2 MIXED HYPERLIPIDEMIA: ICD-10-CM

## 2021-06-17 ENCOUNTER — HOSPITAL ENCOUNTER (OUTPATIENT)
Dept: RADIOLOGY | Facility: MEDICAL CENTER | Age: 76
End: 2021-06-17
Attending: OBSTETRICS & GYNECOLOGY
Payer: MEDICARE

## 2021-06-17 DIAGNOSIS — Z12.31 VISIT FOR SCREENING MAMMOGRAM: ICD-10-CM

## 2021-06-17 PROCEDURE — 77063 BREAST TOMOSYNTHESIS BI: CPT

## 2021-06-22 ENCOUNTER — TELEPHONE (OUTPATIENT)
Dept: MEDICAL GROUP | Facility: LAB | Age: 76
End: 2021-06-22

## 2021-06-22 NOTE — TELEPHONE ENCOUNTER
ANNUAL WELLNESS VISIT PRE-VISIT PLANNING    1.  Reviewed notes from the last office visit: Yes    2.  If any orders were ordered or intended to be done prior to visit (i.e. 6 mos follow-up), do we have results/consult notes or has patient scheduled?        •  Labs -let patient know to have labs done prior to appt    Note: If patient appointment is for lab review and patient did not complete labs, check with provider if OK to reschedule patient until labs completed.       •  Imaging - Imaging ordered, completed and results are in chart.       •  Referrals - No referrals were ordered at last office visit.    3.  Immunizations were updated in Epic using Reconcile Outside Information activity? Yes    4.  Patient is due for the following Health Maintenance Topics:   Health Maintenance Due   Topic Date Due   • COVID-19 Vaccine (2 - Moderna 2-dose series) 01/27/2021   • Annual Wellness Visit  03/12/2021       5.  Reviewed/Updated the following with patient:       •   Preferred Pharmacy? Yes        •   Preferred Lab? Yes        •   Preferred Communication? Yes        •   Allergies? Yes, abstract        •   Medications? Yes, abstract     6.  Care Team Updated:       •   DME Company (gait device, O2, CPAP, etc.): N/A       •   Other Specialists (eye doctor, derm, GYN, cardiology, endo, etc): Yes     7.  Patient was advised: “This is a free wellness visit. The provider will screen for medical conditions to help you stay healthy. If you have other concerns to address you may be asked to discuss these at a separate visit or there may be an additional fee.”     8.  AHA (Puls[employer]) form printed for Provider? N/A

## 2021-06-22 NOTE — TELEPHONE ENCOUNTER
Left message for patient to call back regarding pre-visit planning. Please transfer call to [phone]

## 2021-06-28 ENCOUNTER — HOSPITAL ENCOUNTER (OUTPATIENT)
Dept: LAB | Facility: MEDICAL CENTER | Age: 76
End: 2021-06-28
Attending: FAMILY MEDICINE
Payer: MEDICARE

## 2021-06-28 DIAGNOSIS — D72.819 LEUKOPENIA, UNSPECIFIED TYPE: ICD-10-CM

## 2021-06-28 DIAGNOSIS — R73.01 ELEVATED FASTING GLUCOSE: ICD-10-CM

## 2021-06-28 DIAGNOSIS — E55.9 VITAMIN D DEFICIENCY: ICD-10-CM

## 2021-06-28 DIAGNOSIS — E78.2 MIXED HYPERLIPIDEMIA: ICD-10-CM

## 2021-06-28 LAB
25(OH)D3 SERPL-MCNC: 81 NG/ML (ref 30–100)
ALBUMIN SERPL BCP-MCNC: 4.2 G/DL (ref 3.2–4.9)
ALBUMIN/GLOB SERPL: 1.7 G/DL
ALP SERPL-CCNC: 79 U/L (ref 30–99)
ALT SERPL-CCNC: 12 U/L (ref 2–50)
ANION GAP SERPL CALC-SCNC: 9 MMOL/L (ref 7–16)
AST SERPL-CCNC: 25 U/L (ref 12–45)
BASOPHILS # BLD AUTO: 1.5 % (ref 0–1.8)
BASOPHILS # BLD: 0.06 K/UL (ref 0–0.12)
BILIRUB SERPL-MCNC: 0.4 MG/DL (ref 0.1–1.5)
BUN SERPL-MCNC: 12 MG/DL (ref 8–22)
CALCIUM SERPL-MCNC: 9.1 MG/DL (ref 8.5–10.5)
CHLORIDE SERPL-SCNC: 105 MMOL/L (ref 96–112)
CHOLEST SERPL-MCNC: 186 MG/DL (ref 100–199)
CO2 SERPL-SCNC: 27 MMOL/L (ref 20–33)
CREAT SERPL-MCNC: 0.59 MG/DL (ref 0.5–1.4)
EOSINOPHIL # BLD AUTO: 0.15 K/UL (ref 0–0.51)
EOSINOPHIL NFR BLD: 3.8 % (ref 0–6.9)
ERYTHROCYTE [DISTWIDTH] IN BLOOD BY AUTOMATED COUNT: 43.8 FL (ref 35.9–50)
EST. AVERAGE GLUCOSE BLD GHB EST-MCNC: 120 MG/DL
FASTING STATUS PATIENT QL REPORTED: NORMAL
GLOBULIN SER CALC-MCNC: 2.5 G/DL (ref 1.9–3.5)
GLUCOSE SERPL-MCNC: 99 MG/DL (ref 65–99)
HBA1C MFR BLD: 5.8 % (ref 4–5.6)
HCT VFR BLD AUTO: 45.5 % (ref 37–47)
HDLC SERPL-MCNC: 61 MG/DL
HGB BLD-MCNC: 14.4 G/DL (ref 12–16)
IMM GRANULOCYTES # BLD AUTO: 0 K/UL (ref 0–0.11)
IMM GRANULOCYTES NFR BLD AUTO: 0 % (ref 0–0.9)
LDLC SERPL CALC-MCNC: 108 MG/DL
LYMPHOCYTES # BLD AUTO: 1.39 K/UL (ref 1–4.8)
LYMPHOCYTES NFR BLD: 35.2 % (ref 22–41)
MCH RBC QN AUTO: 30.3 PG (ref 27–33)
MCHC RBC AUTO-ENTMCNC: 31.6 G/DL (ref 33.6–35)
MCV RBC AUTO: 95.6 FL (ref 81.4–97.8)
MONOCYTES # BLD AUTO: 0.47 K/UL (ref 0–0.85)
MONOCYTES NFR BLD AUTO: 11.9 % (ref 0–13.4)
NEUTROPHILS # BLD AUTO: 1.88 K/UL (ref 2–7.15)
NEUTROPHILS NFR BLD: 47.6 % (ref 44–72)
NRBC # BLD AUTO: 0 K/UL
NRBC BLD-RTO: 0 /100 WBC
PLATELET # BLD AUTO: 325 K/UL (ref 164–446)
PMV BLD AUTO: 9.5 FL (ref 9–12.9)
POTASSIUM SERPL-SCNC: 4.7 MMOL/L (ref 3.6–5.5)
PROT SERPL-MCNC: 6.7 G/DL (ref 6–8.2)
RBC # BLD AUTO: 4.76 M/UL (ref 4.2–5.4)
SODIUM SERPL-SCNC: 141 MMOL/L (ref 135–145)
T4 FREE SERPL-MCNC: 1.02 NG/DL (ref 0.93–1.7)
TRIGL SERPL-MCNC: 85 MG/DL (ref 0–149)
TSH SERPL DL<=0.005 MIU/L-ACNC: 2.32 UIU/ML (ref 0.38–5.33)
WBC # BLD AUTO: 4 K/UL (ref 4.8–10.8)

## 2021-06-28 PROCEDURE — 84443 ASSAY THYROID STIM HORMONE: CPT

## 2021-06-28 PROCEDURE — 83036 HEMOGLOBIN GLYCOSYLATED A1C: CPT | Mod: GA

## 2021-06-28 PROCEDURE — 36415 COLL VENOUS BLD VENIPUNCTURE: CPT

## 2021-06-28 PROCEDURE — 85025 COMPLETE CBC W/AUTO DIFF WBC: CPT

## 2021-06-28 PROCEDURE — 80061 LIPID PANEL: CPT

## 2021-06-28 PROCEDURE — 82306 VITAMIN D 25 HYDROXY: CPT

## 2021-06-28 PROCEDURE — 80053 COMPREHEN METABOLIC PANEL: CPT

## 2021-06-28 PROCEDURE — 84439 ASSAY OF FREE THYROXINE: CPT

## 2021-06-29 ENCOUNTER — OFFICE VISIT (OUTPATIENT)
Dept: MEDICAL GROUP | Facility: LAB | Age: 76
End: 2021-06-29
Payer: MEDICARE

## 2021-06-29 VITALS
OXYGEN SATURATION: 96 % | WEIGHT: 122 LBS | DIASTOLIC BLOOD PRESSURE: 60 MMHG | RESPIRATION RATE: 16 BRPM | BODY MASS INDEX: 19.61 KG/M2 | SYSTOLIC BLOOD PRESSURE: 120 MMHG | HEART RATE: 72 BPM | HEIGHT: 66 IN | TEMPERATURE: 97.5 F

## 2021-06-29 DIAGNOSIS — Z00.00 MEDICARE ANNUAL WELLNESS VISIT, SUBSEQUENT: Primary | ICD-10-CM

## 2021-06-29 DIAGNOSIS — M81.0 AGE-RELATED OSTEOPOROSIS WITHOUT CURRENT PATHOLOGICAL FRACTURE: ICD-10-CM

## 2021-06-29 DIAGNOSIS — I72.5 BASILAR ARTERY ANEURYSM (HCC): ICD-10-CM

## 2021-06-29 DIAGNOSIS — E55.9 VITAMIN D DEFICIENCY: ICD-10-CM

## 2021-06-29 DIAGNOSIS — E78.2 MIXED HYPERLIPIDEMIA: ICD-10-CM

## 2021-06-29 PROCEDURE — G0439 PPPS, SUBSEQ VISIT: HCPCS | Performed by: FAMILY MEDICINE

## 2021-06-29 ASSESSMENT — ENCOUNTER SYMPTOMS: GENERAL WELL-BEING: GOOD

## 2021-06-29 ASSESSMENT — ACTIVITIES OF DAILY LIVING (ADL): BATHING_REQUIRES_ASSISTANCE: 0

## 2021-06-29 ASSESSMENT — FIBROSIS 4 INDEX: FIB4 SCORE: 1.687639248400444542

## 2021-06-29 ASSESSMENT — PATIENT HEALTH QUESTIONNAIRE - PHQ9: CLINICAL INTERPRETATION OF PHQ2 SCORE: 0

## 2021-06-29 NOTE — PROGRESS NOTES
Chief Complaint   Patient presents with   • Annual Wellness Visit         HPI:  Kim is a 75 y.o. here for Medicare Annual Wellness Visit        Patient Active Problem List    Diagnosis Date Noted   • Leukopenia 05/07/2020   • Vitamin D deficiency 03/11/2020   • Schatzki's ring 09/10/2019   • Age-related osteoporosis without current pathological fracture 09/10/2019   • Mixed hyperlipidemia 09/10/2019   • Basilar artery aneurysm (HCC) 09/10/2019   • H/O Mohs surgery for squamous cell carcinoma of skin 02/22/2018   • H/O Mohs surgery for basal cell carcinoma 02/22/2018   • H/O Sebastian-Barr virus infection 02/22/2018   • Left anterior fascicular block 02/21/2018       Current Outpatient Medications   Medication Sig Dispense Refill   • polymixin-trimethoprim (POLYTRIM) 09885-2.1 UNIT/ML-% Solution 3 drops in affected I every 6 hours until eye symptoms resolve 10 mL 0   • calcium carbonate (OS-PAUL 500) 1250 (500 Ca) MG Tab Take 500 mg by mouth every day.     • vitamin D (CHOLECALCIFEROL) 1000 UNIT Tab Take 1,000 Units by mouth every day. Taking 4000 a day     • magnesium gluconate (MAG-G) 500 MG tablet Take 500 mg by mouth 3 times a day.     • Calcium-Magnesium-Vitamin D 600- MG-MG-UNIT TABLET SR 24 HR Take 1 Tab by mouth 2 Times a Day.     • aspirin 81 MG EC tablet Take 81 mg by mouth every day.     • lansoprazole (PREVACID) 15 MG CAPSULE DELAYED RELEASE Take 15 mg by mouth every day.       No current facility-administered medications for this visit.        Patient is taking medications as noted in medication list.  Current supplements as per medication list.     Allergies: Clarithromycin, Penicillin v, and Phenolphthalein    Current social contact/activities: hiking and camping, dinners with friends      Is patient current with immunizations? Yes.    She  reports that she has never smoked. She has never used smokeless tobacco. She reports current alcohol use of about 2.4 - 3.0 oz of alcohol per week. She reports  that she does not use drugs.  Counseling given: Not Answered  Comment: abstinence        DPA/Advanced directive: Patient has Living Will, but it is not on file. Instructed to bring in a copy to scan into their chart.    ROS:    Gait: Uses no assistive device   Ostomy: No   Other tubes: No   Amputations: No   Chronic oxygen use No   Last eye exam December 2020   Wears hearing aids: No   : Denies any urinary leakage during the last 6 months      Screening:        Depression Screening    Little interest or pleasure in doing things?  0 - not at all  Feeling down, depressed, or hopeless? 0 - not at all  Patient Health Questionnaire Score: 0    If depressive symptoms identified deferred to follow up visit unless specifically addressed in assessment and plan.    Interpretation of PHQ-9 Total Score   Score Severity   1-4 No Depression   5-9 Mild Depression   10-14 Moderate Depression   15-19 Moderately Severe Depression   20-27 Severe Depression    Screening for Cognitive Impairment    Three Minute Recall (captain, garden, picture)  3/3    Giovanny clock face with all 12 numbers and set the hands to show 5 past 8.  Yes    If cognitive concerns identified, deferred for follow up unless specifically addressed in assessment and plan.    Fall Risk Assessment    Has the patient had two or more falls in the last year or any fall with injury in the last year?  Yes  If fall risk identified, deferred for follow up unless specifically addressed in assessment and plan.    Safety Assessment    Throw rugs on floor.  No  Handrails on all stairs.  Yes  Good lighting in all hallways.  Yes  Difficulty hearing.  No  Patient counseled about all safety risks that were identified.    Functional Assessment ADLs    Are there any barriers preventing you from cooking for yourself or meeting nutritional needs?  No.    Are there any barriers preventing you from driving safely or obtaining transportation?  No.    Are there any barriers preventing you  from using a telephone or calling for help?  No.    Are there any barriers preventing you from shopping?  No.    Are there any barriers preventing you from taking care of your own finances?  No.    Are there any barriers preventing you from managing your medications?  No.    Are there any barriers preventing you from showering, bathing or dressing yourself?  No.    Are you currently engaging in any exercise or physical activity?  Yes.     What is your perception of your health?  Good.    Health Maintenance Summary                COVID-19 Vaccine Overdue 1/27/2021      Done 12/30/2020 Imm Admin: Moderna SARS-CoV-2 Vaccine    MAMMOGRAM Next Due 6/17/2022      Done 6/17/2021 MA-SCREENING MAMMO BILAT W/TOMOSYNTHESIS W/CAD     Patient has more history with this topic...    Annual Wellness Visit Next Due 6/30/2022      Done 6/29/2021 Visit Dx: Medicare annual wellness visit, subsequent     Patient has more history with this topic...    BONE DENSITY Next Due 8/28/2023      Done 8/28/2018 DS-BONE DENSITY STUDY (DEXA)     Patient has more history with this topic...    COLONOSCOPY Next Due 10/23/2024      Done 10/23/2019 REFERRAL TO GI FOR COLONOSCOPY     Patient has more history with this topic...    IMM DTaP/Tdap/Td Vaccine Next Due 7/6/2026      Done 7/6/2016 Imm Admin: Tdap Vaccine          Patient Care Team:  Samara Jeter M.D. as PCP - General (Family Medicine)  Gem Hamm M.D. (Dermatology)  Tim Cassidy M.D. (Gastroenterology)  Dylan March M.D. (Gynecology)    Social History     Tobacco Use   • Smoking status: Never Smoker   • Smokeless tobacco: Never Used   • Tobacco comment: abstinence   Vaping Use   • Vaping Use: Never used   Substance Use Topics   • Alcohol use: Yes     Alcohol/week: 2.4 - 3.0 oz     Types: 4 - 5 Glasses of wine per week     Comment: White wine socially   • Drug use: No     Family History   Problem Relation Age of Onset   • Cancer Mother         breast   • Heart Disease Father      She  " has a past medical history of Basilar artery aneurysm (HCC), GERD (gastroesophageal reflux disease), Hyperlipidemia, Osteoporosis, and Schatzki's ring. She also has no past medical history of Encounter for long-term (current) use of other medications.   Past Surgical History:   Procedure Laterality Date   • CHOLECYSTECTOMY     • LUMPECTOMY     • MENISCUS REPAIR Right    • US-NEEDLE CORE BX-BREAST PANEL             Exam:     /60 (BP Location: Right arm, Patient Position: Sitting, BP Cuff Size: Adult)   Pulse 72   Temp 36.4 °C (97.5 °F) (Temporal)   Resp 16   Ht 1.676 m (5' 6\")   Wt 55.3 kg (122 lb)   SpO2 96%  Body mass index is 19.69 kg/m².    Hearing good.    Alert, oriented in no acute distress.  Eye contact is good, speech goal directed, affect calm  Constitutional: Alert, no distress.  Skin: Warm, dry, good turgor, no rashes in visible areas.  Eye: Equal, round and reactive, conjunctiva clear, lids normal.  ENMT: Pinna are normal.  TMs are clear bilaterally  Neck: Trachea midline, no masses, no thyromegaly. No cervical or supraclavicular lymphadenopathy  Respiratory: Unlabored respiratory effort, lungs clear to auscultation, no wheezes, no ronchi.  Cardiovascular: Normal S1, S2, RRR, no murmur, no edema.  Abdomen: Soft, non-tender, no masses, no hepatosplenomegaly.  Psych: Alert and oriented x3, normal affect and mood.        Assessment and Plan. The following treatment and monitoring plan is recommended:    1. Medicare annual wellness visit, subsequent  Routine anticipatory guidance.  No special services needed at this time    2. Vitamin D deficiency  Vitamin D level has normalized with current supplementation.  Continue current dose    3. Mixed hyperlipidemia  Results for CHERYL MACHADO (MRN [id number]) as of 6/30/2021 15:41   Ref. Range 6/28/2021 07:18   Cholesterol,Tot Latest Ref Range: 100 - 199 mg/dL 186   Triglycerides Latest Ref Range: 0 - 149 mg/dL 85   HDL Latest Ref Range: >=40 mg/dL " 61   LDL Latest Ref Range: <100 mg/dL 108 (H)   The 10-year ASCVD risk score ([place] PIPO Jr., et al., 2013) is: 15.9%  Discuss statin use given her cardiovascular risk and her LDL.  She would prefer to avoid.  Mediterranean diet discussed    4. Basilar artery aneurysm (HCC)  Continue to monitor    5. Age-related osteoporosis without current pathological fracture  Continue weightbearing exercise, calcium and vitamin D supplementation.      Services suggested: No services needed at this time  Health Care Screening recommendations as per orders if indicated.  Referrals offered: PT/OT/Nutrition counseling/Behavioral Health/Smoking cessation as per orders if indicated.    Discussion today about general wellness and lifestyle habits:    · Prevent falls and reduce trip hazards; Cautioned about securing or removing rugs.  · Have a working fire alarm and carbon monoxide detector;   · Engage in regular physical activity and social activities       Follow-up: Return in about 1 year (around 6/29/2022).

## 2021-06-29 NOTE — PATIENT INSTRUCTIONS
Mediterranean Diet  A Mediterranean diet refers to food and lifestyle choices that are based on the traditions of countries located on the [place]. This way of eating has been shown to help prevent certain conditions and improve outcomes for people who have chronic diseases, like kidney disease and heart disease.  What are tips for following this plan?  Lifestyle  · Cook and eat meals together with your family, when possible.  · Drink enough fluid to keep your urine clear or pale yellow.  · Be physically active every day. This includes:  ? Aerobic exercise like running or swimming.  ? Leisure activities like gardening, walking, or housework.  · Get 7-8 hours of sleep each night.  · If recommended by your health care provider, drink red wine in moderation. This means 1 glass a day for nonpregnant women and 2 glasses a day for men. A glass of wine equals 5 oz (150 mL).  Reading food labels    · Check the serving size of packaged foods. For foods such as rice and pasta, the serving size refers to the amount of cooked product, not dry.  · Check the total fat in packaged foods. Avoid foods that have saturated fat or trans fats.  · Check the ingredients list for added sugars, such as corn syrup.  Shopping  · At the grocery store, buy most of your food from the areas near the walls of the store. This includes:  ? Fresh fruits and vegetables (produce).  ? Grains, beans, nuts, and seeds. Some of these may be available in unpackaged forms or large amounts (in bulk).  ? Fresh seafood.  ? Poultry and eggs.  ? Low-fat dairy products.  · Buy whole ingredients instead of prepackaged foods.  · Buy fresh fruits and vegetables in-season from local farmers markets.  · Buy frozen fruits and vegetables in resealable bags.  · If you do not have access to quality fresh seafood, buy precooked frozen shrimp or canned fish, such as tuna, salmon, or sardines.  · Buy small amounts of raw or cooked vegetables, salads, or olives from  the deli or salad bar at your store.  · Stock your pantry so you always have certain foods on hand, such as olive oil, canned tuna, canned tomatoes, rice, pasta, and beans.  Cooking  · Cook foods with extra-virgin olive oil instead of using butter or other vegetable oils.  · Have meat as a side dish, and have vegetables or grains as your main dish. This means having meat in small portions or adding small amounts of meat to foods like pasta or stew.  · Use beans or vegetables instead of meat in common dishes like chili or lasagna.  · [place] with different cooking methods. Try roasting or broiling vegetables instead of steaming or sautéeing them.  · Add frozen vegetables to soups, stews, pasta, or rice.  · Add nuts or seeds for added healthy fat at each meal. You can add these to yogurt, salads, or vegetable dishes.  · Marinate fish or vegetables using olive oil, lemon juice, garlic, and fresh herbs.  Meal planning    · Plan to eat 1 vegetarian meal one day each week. Try to work up to 2 vegetarian meals, if possible.  · Eat seafood 2 or more times a week.  · Have healthy snacks readily available, such as:  ? Vegetable sticks with hummus.  ? Greek yogurt.  ? Fruit and nut trail mix.  · Eat balanced meals throughout the week. This includes:  ? Fruit: 2-3 servings a day  ? Vegetables: 4-5 servings a day  ? Low-fat dairy: 2 servings a day  ? Fish, poultry, or lean meat: 1 serving a day  ? Beans and legumes: 2 or more servings a week  ? Nuts and seeds: 1-2 servings a day  ? Whole grains: 6-8 servings a day  ? Extra-virgin olive oil: 3-4 servings a day  · Limit red meat and sweets to only a few servings a month  What are my food choices?  · Mediterranean diet  ? Recommended  § Grains: Whole-grain pasta. Brown rice. Bulgar wheat. Polenta. Couscous. Whole-wheat bread. Oatmeal. Quinoa.  § Vegetables: Artichokes. Beets. Broccoli. Cabbage. Carrots. Eggplant. Green beans. Chard. Kale. Spinach. Onions. Leeks. Peas. Squash.  Tomatoes. Peppers. Radishes.  § Fruits: Apples. Apricots. Avocado. Berries. Bananas. Cherries. Dates. Figs. Grapes. Kevin. Melon. Oranges. Peaches. Plums. Pomegranate.  § Meats and other protein foods: Beans. Almonds. Sunflower seeds. Pine nuts. Peanuts. Cod. [place]. Scallops. Shrimp. Tuna. Tilapia. Clams. Oysters. Eggs.  § Dairy: Low-fat milk. Cheese. Greek yogurt.  § Beverages: Water. Red wine. Herbal tea.  § Fats and oils: Extra virgin olive oil. Avocado oil. Grape seed oil.  § Sweets and desserts: Greek yogurt with honey. Baked apples. Poached pears. Trail mix.  § Seasoning and other foods: Basil. Cilantro. Coriander. Cumin. Mint. Parsley. Shade. Rosemary. Tarragon. Garlic. Oregano. Thyme. Pepper. Balsalmic vinegar. Tahini. Hummus. Tomato sauce. Olives. Mushrooms.  ? Limit these  § Grains: Prepackaged pasta or rice dishes. Prepackaged cereal with added sugar.  § Vegetables: Deep fried potatoes (french fries).  § Fruits: Fruit canned in syrup.  § Meats and other protein foods: Beef. Pork. Lamb. Poultry with skin. Hot dogs. [place].  § Dairy: Ice cream. Sour cream. Whole milk.  § Beverages: Juice. Sugar-sweetened soft drinks. Beer. Liquor and spirits.  § Fats and oils: Butter. Canola oil. Vegetable oil. Beef fat (tallow). Lard.  § Sweets and desserts: Cookies. Cakes. Pies. Candy.  § Seasoning and other foods: Mayonnaise. Premade sauces and marinades.  The items listed may not be a complete list. Talk with your dietitian about what dietary choices are right for you.  Summary  · The Mediterranean diet includes both food and lifestyle choices.  · Eat a variety of fresh fruits and vegetables, beans, nuts, seeds, and whole grains.  · Limit the amount of red meat and sweets that you eat.  · Talk with your health care provider about whether it is safe for you to drink red wine in moderation. This means 1 glass a day for nonpregnant women and 2 glasses a day for men. A glass of wine equals 5 oz (150 mL).  This information  is not intended to replace advice given to you by your health care provider. Make sure you discuss any questions you have with your health care provider.  Document Released: 08/10/2017 Document Revised: 08/17/2017 Document Reviewed: 08/10/2017  [employer] Patient Education © 2020 [employer]    Preventive Care 65 Years and Older, Female  Preventive care refers to lifestyle choices and visits with your health care provider that can promote health and wellness. This includes:  · A yearly physical exam. This is also called an annual well check.  · Regular dental and eye exams.  · Immunizations.  · Screening for certain conditions.  · Healthy lifestyle choices, such as diet and exercise.  What can I expect for my preventive care visit?  Physical exam  Your health care provider will check:  · Height and weight. These may be used to calculate body mass index (BMI), which is a measurement that tells if you are at a healthy weight.  · Heart rate and blood pressure.  · Your skin for abnormal spots.  Counseling  Your health care provider may ask you questions about:  · Alcohol, tobacco, and drug use.  · Emotional well-being.  · Home and relationship well-being.  · Sexual activity.  · Eating habits.  · History of falls.  · Memory and ability to understand (cognition).  · Work and work environment.  · Pregnancy and menstrual history.  What immunizations do I need?    Influenza (flu) vaccine  · This is recommended every year.  Tetanus, diphtheria, and pertussis (Tdap) vaccine  · You may need a Td booster every 10 years.  Varicella (chickenpox) vaccine  · You may need this vaccine if you have not already been vaccinated.  Zoster (shingles) vaccine  · You may need this after age 60.  Pneumococcal conjugate (PCV13) vaccine  · One dose is recommended after age 65.  Pneumococcal polysaccharide (PPSV23) vaccine  · One dose is recommended after age 65.  Measles, mumps, and rubella (MMR) vaccine  · You may need at least one dose of MMR  if you were born in 1957 or later. You may also need a second dose.  Meningococcal conjugate (MenACWY) vaccine  · You may need this if you have certain conditions.  Hepatitis A vaccine  · You may need this if you have certain conditions or if you travel or work in places where you may be exposed to hepatitis A.  Hepatitis B vaccine  · You may need this if you have certain conditions or if you travel or work in places where you may be exposed to hepatitis B.  Haemophilus influenzae type b (Hib) vaccine  · You may need this if you have certain conditions.  You may receive vaccines as individual doses or as more than one vaccine together in one shot (combination vaccines). Talk with your health care provider about the risks and benefits of combination vaccines.  What tests do I need?  Blood tests  · Lipid and cholesterol levels. These may be checked every 5 years, or more frequently depending on your overall health.  · Hepatitis C test.  · Hepatitis B test.  Screening  · Lung cancer screening. You may have this screening every year starting at age 55 if you have a 30-pack-year history of smoking and currently smoke or have quit within the past 15 years.  · Colorectal cancer screening. All adults should have this screening starting at age 50 and continuing until age 75. Your health care provider may recommend screening at age 45 if you are at increased risk. You will have tests every 1-10 years, depending on your results and the type of screening test.  · Diabetes screening. This is done by checking your blood sugar (glucose) after you have not eaten for a while (fasting). You may have this done every 1-3 years.  · Mammogram. This may be done every 1-2 years. Talk with your health care provider about how often you should have regular mammograms.  · BRCA-related cancer screening. This may be done if you have a family history of breast, ovarian, tubal, or peritoneal cancers.  Other tests  · Sexually transmitted disease  (STD) testing.  · Bone density scan. This is done to screen for osteoporosis. You may have this done starting at age 65.  Follow these instructions at home:  Eating and drinking  · Eat a diet that includes fresh fruits and vegetables, whole grains, lean protein, and low-fat dairy products. Limit your intake of foods with high amounts of sugar, saturated fats, and salt.  · Take vitamin and mineral supplements as recommended by your health care provider.  · Do not drink alcohol if your health care provider tells you not to drink.  · If you drink alcohol:  ? Limit how much you have to 0-1 drink a day.  ? Be aware of how much alcohol is in your drink. In the [place] one drink equals one 12 oz bottle of beer (355 mL), one 5 oz glass of wine (148 mL), or one 1½ oz glass of hard liquor (44 mL).  Lifestyle  · Take daily care of your teeth and gums.  · Stay active. Exercise for at least 30 minutes on 5 or more days each week.  · Do not use any products that contain nicotine or tobacco, such as cigarettes, e-cigarettes, and chewing tobacco. If you need help quitting, ask your health care provider.  · If you are sexually active, practice safe sex. Use a condom or other form of protection in order to prevent STIs (sexually transmitted infections).  · Talk with your health care provider about taking a low-dose aspirin or statin.  What's next?  · Go to your health care provider once a year for a well check visit.  · Ask your health care provider how often you should have your eyes and teeth checked.  · Stay up to date on all vaccines.  This information is not intended to replace advice given to you by your health care provider. Make sure you discuss any questions you have with your health care provider.  Document Released: 01/13/2017 Document Revised: 12/12/2019 Document Reviewed: 12/12/2019  Elsevier Patient Education © 2020 [employer]

## 2021-08-13 ENCOUNTER — HOSPITAL ENCOUNTER (OUTPATIENT)
Dept: RADIOLOGY | Facility: MEDICAL CENTER | Age: 76
End: 2021-08-13
Attending: NURSE PRACTITIONER
Payer: MEDICARE

## 2021-08-13 DIAGNOSIS — I67.1 CEREBRAL ANEURYSM, NONRUPTURED: ICD-10-CM

## 2021-08-13 PROCEDURE — 70496 CT ANGIOGRAPHY HEAD: CPT | Mod: MH

## 2021-08-13 PROCEDURE — 70498 CT ANGIOGRAPHY NECK: CPT | Mod: MH

## 2021-08-13 PROCEDURE — 700117 HCHG RX CONTRAST REV CODE 255: Performed by: NURSE PRACTITIONER

## 2021-08-13 RX ADMIN — IOHEXOL 100 ML: 350 INJECTION, SOLUTION INTRAVENOUS at 14:39

## 2022-03-21 ENCOUNTER — HOSPITAL ENCOUNTER (OUTPATIENT)
Dept: LAB | Facility: MEDICAL CENTER | Age: 77
End: 2022-03-21
Attending: INTERNAL MEDICINE
Payer: MEDICARE

## 2022-03-21 LAB
FOLATE SERPL-MCNC: >40 NG/ML
MAGNESIUM SERPL-MCNC: 2.1 MG/DL (ref 1.5–2.5)
VIT B12 SERPL-MCNC: 1260 PG/ML (ref 211–911)

## 2022-03-21 PROCEDURE — 36415 COLL VENOUS BLD VENIPUNCTURE: CPT

## 2022-03-21 PROCEDURE — 82746 ASSAY OF FOLIC ACID SERUM: CPT

## 2022-03-21 PROCEDURE — 82607 VITAMIN B-12: CPT

## 2022-03-21 PROCEDURE — 83735 ASSAY OF MAGNESIUM: CPT

## 2022-03-28 ENCOUNTER — HOSPITAL ENCOUNTER (OUTPATIENT)
Dept: LAB | Facility: MEDICAL CENTER | Age: 77
End: 2022-03-28
Attending: NURSE PRACTITIONER
Payer: MEDICARE

## 2022-03-28 PROCEDURE — 36415 COLL VENOUS BLD VENIPUNCTURE: CPT

## 2022-03-28 PROCEDURE — 82784 ASSAY IGA/IGD/IGG/IGM EACH: CPT

## 2022-03-28 PROCEDURE — 83516 IMMUNOASSAY NONANTIBODY: CPT

## 2022-03-30 LAB
GLIADIN PEPTIDE+TTG IGA+IGG SER QL IA: 5 UNITS (ref 0–19)
IGA SERPL-MCNC: 97 MG/DL (ref 68–408)

## 2022-05-16 ENCOUNTER — HOSPITAL ENCOUNTER (OUTPATIENT)
Dept: RADIOLOGY | Facility: MEDICAL CENTER | Age: 77
End: 2022-05-16
Attending: PHYSICIAN ASSISTANT
Payer: MEDICARE

## 2022-05-16 DIAGNOSIS — M70.52 BURSITIS OF LEFT KNEE, UNSPECIFIED BURSA: ICD-10-CM

## 2022-05-16 DIAGNOSIS — M25.562 ACUTE PAIN OF LEFT KNEE: ICD-10-CM

## 2022-05-16 PROCEDURE — 93971 EXTREMITY STUDY: CPT | Mod: LT

## 2022-05-16 PROCEDURE — 93971 EXTREMITY STUDY: CPT | Mod: 26,LT | Performed by: INTERNAL MEDICINE

## 2022-06-20 ENCOUNTER — HOSPITAL ENCOUNTER (OUTPATIENT)
Dept: RADIOLOGY | Facility: MEDICAL CENTER | Age: 77
End: 2022-06-20
Attending: FAMILY MEDICINE
Payer: MEDICARE

## 2022-06-20 DIAGNOSIS — Z12.31 VISIT FOR SCREENING MAMMOGRAM: ICD-10-CM

## 2022-06-20 PROCEDURE — 77063 BREAST TOMOSYNTHESIS BI: CPT

## 2022-09-27 ENCOUNTER — APPOINTMENT (OUTPATIENT)
Dept: RADIOLOGY | Facility: MEDICAL CENTER | Age: 77
End: 2022-09-27
Attending: NEUROLOGICAL SURGERY
Payer: MEDICARE

## 2022-09-27 ENCOUNTER — HOSPITAL ENCOUNTER (OUTPATIENT)
Dept: LAB | Facility: MEDICAL CENTER | Age: 77
End: 2022-09-27
Attending: NEUROLOGICAL SURGERY
Payer: MEDICARE

## 2022-09-30 ENCOUNTER — HOSPITAL ENCOUNTER (OUTPATIENT)
Facility: MEDICAL CENTER | Age: 77
End: 2022-09-30
Attending: NEUROLOGICAL SURGERY
Payer: MEDICARE

## 2022-09-30 LAB
BUN SERPL-MCNC: 11 MG/DL (ref 8–22)
CREAT SERPL-MCNC: 0.7 MG/DL (ref 0.5–1.4)
GFR SERPLBLD CREATININE-BSD FMLA CKD-EPI: 88 ML/MIN/1.73 M 2

## 2022-09-30 PROCEDURE — 84520 ASSAY OF UREA NITROGEN: CPT

## 2022-09-30 PROCEDURE — 36415 COLL VENOUS BLD VENIPUNCTURE: CPT

## 2022-09-30 PROCEDURE — 82565 ASSAY OF CREATININE: CPT

## 2022-10-02 ENCOUNTER — HOSPITAL ENCOUNTER (OUTPATIENT)
Dept: RADIOLOGY | Facility: MEDICAL CENTER | Age: 77
End: 2022-10-02
Attending: NEUROLOGICAL SURGERY
Payer: MEDICARE

## 2022-10-02 DIAGNOSIS — I67.1 CEREBRAL ANEURYSM, NONRUPTURED: ICD-10-CM

## 2022-10-02 PROCEDURE — 70498 CT ANGIOGRAPHY NECK: CPT

## 2022-10-02 PROCEDURE — 700117 HCHG RX CONTRAST REV CODE 255: Performed by: NEUROLOGICAL SURGERY

## 2022-10-02 PROCEDURE — 70496 CT ANGIOGRAPHY HEAD: CPT

## 2022-10-02 RX ADMIN — IOHEXOL 80 ML: 350 INJECTION, SOLUTION INTRAVENOUS at 08:37

## 2022-11-09 ENCOUNTER — PATIENT MESSAGE (OUTPATIENT)
Dept: HEALTH INFORMATION MANAGEMENT | Facility: OTHER | Age: 77
End: 2022-11-09

## 2023-05-04 ENCOUNTER — TELEPHONE (OUTPATIENT)
Dept: MEDICAL GROUP | Facility: LAB | Age: 78
End: 2023-05-04
Payer: MEDICARE

## 2023-05-04 RX ORDER — AZITHROMYCIN 250 MG/1
TABLET, FILM COATED ORAL
COMMUNITY
Start: 2023-05-02

## 2023-05-04 RX ORDER — CHLORAL HYDRATE 500 MG
1000 CAPSULE ORAL
COMMUNITY

## 2023-05-04 NOTE — TELEPHONE ENCOUNTER
NEW PATIENT VISIT PRE-VISIT PLANNING    1.  [employer] Patient is checked in Patient Demographics?Yes    2.  Immunizations were updated in Epic using Reconcile Outside Information activity? Yes         3.  Is this appointment scheduled as a Hospital Follow-Up? No    4.  Patient is due for the following Health Maintenance Topics:   Health Maintenance Due   Topic Date Due    HEPATITIS C SCREENING  Never done    Annual Wellness Visit  06/30/2022       5.  Reviewed/Updated the following with patient:          Preferred Pharmacy? Yes          Preferred Lab? Yes          Preferred Communication? Yes          Allergies? Yes          Medications? YES. Was Abstract Encounter opened and chart updated? YES    6.  Updated Care Team?          DME Company (gait device, O2, CPAP, etc.) N\A          Other Specialists (eye doctor, derm, GYN, cardiology, endo, etc): YES    7.  AHA (Puls8) form printed for Provider? N/A

## 2023-05-11 ENCOUNTER — OFFICE VISIT (OUTPATIENT)
Dept: MEDICAL GROUP | Facility: LAB | Age: 78
End: 2023-05-11
Payer: MEDICARE

## 2023-05-11 VITALS
OXYGEN SATURATION: 96 % | SYSTOLIC BLOOD PRESSURE: 118 MMHG | TEMPERATURE: 97.2 F | DIASTOLIC BLOOD PRESSURE: 72 MMHG | BODY MASS INDEX: 19.8 KG/M2 | WEIGHT: 123.2 LBS | RESPIRATION RATE: 14 BRPM | HEART RATE: 88 BPM | HEIGHT: 66 IN

## 2023-05-11 DIAGNOSIS — R73.03 PREDIABETES: ICD-10-CM

## 2023-05-11 DIAGNOSIS — I72.5 BASILAR ARTERY ANEURYSM (HCC): ICD-10-CM

## 2023-05-11 DIAGNOSIS — M81.0 AGE-RELATED OSTEOPOROSIS WITHOUT CURRENT PATHOLOGICAL FRACTURE: ICD-10-CM

## 2023-05-11 DIAGNOSIS — K22.2 SCHATZKI'S RING: ICD-10-CM

## 2023-05-11 DIAGNOSIS — E78.2 MIXED HYPERLIPIDEMIA: ICD-10-CM

## 2023-05-11 PROCEDURE — 3074F SYST BP LT 130 MM HG: CPT | Performed by: FAMILY MEDICINE

## 2023-05-11 PROCEDURE — 3078F DIAST BP <80 MM HG: CPT | Performed by: FAMILY MEDICINE

## 2023-05-11 PROCEDURE — 99214 OFFICE O/P EST MOD 30 MIN: CPT | Performed by: FAMILY MEDICINE

## 2023-05-11 RX ORDER — UBIDECARENONE 75 MG
100 CAPSULE ORAL DAILY
COMMUNITY

## 2023-05-11 RX ORDER — VITAMIN E 268 MG
400 CAPSULE ORAL DAILY
COMMUNITY

## 2023-05-11 RX ORDER — PHENOL 1.4 %
AEROSOL, SPRAY (ML) MUCOUS MEMBRANE
COMMUNITY

## 2023-05-11 ASSESSMENT — PATIENT HEALTH QUESTIONNAIRE - PHQ9: CLINICAL INTERPRETATION OF PHQ2 SCORE: 0

## 2023-05-11 ASSESSMENT — FIBROSIS 4 INDEX: FIB4 SCORE: 1.732050807568877293

## 2023-05-11 NOTE — PROGRESS NOTES
Kim Healy is a 77 y.o. female here to establish care and discuss chronic medical conditions.    Has history of Schatzki's ring which has required multiple dilations in the past.  Continues on Prevacid and follows with GI.    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  This is followed by neurosurgery and she reports that most recently actually discussed that this may have not been a true aneurysm and more just an artifact at the junction of 2 vessels.  Either way she has an MRA planned later this year.    Has preferred to avoid pharmacotherapy for her osteoporosis given concern for side effects.  She is very active with hiking.  Does calcium and vitamin D supplements.    Current medicines (including changes today)  Current Outpatient Medications   Medication Sig Dispense Refill    cyanocobalamin (VITAMIN B-12) 100 MCG Tab Take 100 mcg by mouth every day.      vitamin e (VITAMIN E) 400 UNIT Cap Take 400 Units by mouth every day.      vitamin A 3 mg (26873 units) capsule Take 10,000 Units by mouth every day.      Multiple Vitamins-Minerals (MULTIVITAMIN ADULTS 50+) Tab Take  by mouth.      azithromycin (ZITHROMAX) 250 MG Tab TAKE 2 TABLETS BY MOUTH ON DAY 1, AND THEN TAKE 1 TABLET BY MOUTH ONCE A DAY ON DAY 2 THROUGH DAY 5      Omega-3 Fatty Acids (FISH OIL) 1000 MG Cap capsule Take 1,000 mg by mouth 3 times a day with meals.      vitamin D (CHOLECALCIFEROL) 1000 UNIT Tab Take 500 Units by mouth every day. Taking 4000 a day      magnesium gluconate (MAG-G) 500 MG tablet Take 500 mg by mouth 3 times a day.      Calcium-Magnesium-Vitamin D 600- MG-MG-UNIT TABLET SR 24 HR Take 1 Tab by mouth 2 Times a Day.      aspirin 81 MG EC tablet Take 81 mg by mouth every day.      lansoprazole (PREVACID) 15 MG CAPSULE DELAYED RELEASE Take 15 mg by mouth every day.      polymixin-trimethoprim (POLYTRIM) 22677-1.1 UNIT/ML-% Solution 3 drops in affected I every 6 hours until eye symptoms resolve 10 mL  "0     No current facility-administered medications for this visit.     She  has a past medical history of Basilar artery aneurysm (HCC), GERD (gastroesophageal reflux disease), Hyperlipidemia, Osteoporosis, and Schatzki's ring.    She has no past medical history of Encounter for long-term (current) use of other medications.  She  has a past surgical history that includes us-needle core bx-breast panel; cholecystectomy; lumpectomy; and meniscus repair (Right).  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 She has a daughter in town who is an OB/GYN. She follows a very active lifestyle including hiking, resistance training, and weight training. She eats a well balanced diet. She does not use a gait aid.     Family History   Problem Relation Age of Onset    Cancer Mother         breast    Heart Disease Father      Family Status   Relation Name Status    Mo   at age 95    Fa   at age 56       ROS  See HPI     Objective:     Physical Exam:  /72 (BP Location: Left arm, Patient Position: Sitting, BP Cuff Size: Adult)   Pulse 88   Temp 36.2 °C (97.2 °F)   Resp 14   Ht 1.676 m (5' 6\")   Wt 55.9 kg (123 lb 3.2 oz)   SpO2 96%  Body mass index is 19.89 kg/m².  Constitutional: Alert. Well appearing. No distress.  Skin: Warm, dry, good turgor, no visible rashes.  Eye: Equal, round and reactive to light, conjunctiva clear, lids normal.  ENMT: Moist mucous membranes.  Neck: Trachea midline, no masses, no thyromegaly. No cervical or supraclavicular lymphadenopathy.  Respiratory: Normal effort. Lungs are clear " to auscultation bilaterally.  Cardiovascular: Regular rate and rhythm. Normal S1/S2. No murmurs, rubs or gallops.   Neuro: Moves all four extremities. No facial droop.  Psych: Answers questions appropriately. Normal affect and mood.    Assessment and Plan:     1. Schatzki's ring  Multiple previous dilations, continues on PPI.  - CBC WITH DIFFERENTIAL; Future  - Comp Metabolic Panel; Future    2. Basilar artery aneurysm (HCC)  Monitored by neurosurgery, she reports there was discussion that this is not actually a true aneurysm.  Has MRA planned later this year.    3. Age-related osteoporosis without current pathological fracture  Has preferred to avoid pharmacotherapy for her osteoporosis given concern for side effects.  She is very active with hiking.  Does calcium and vitamin D supplements.  Consider repeat DEXA next year.    4. Prediabetes  Follow A1c, she continues to stay very active.  - HEMOGLOBIN A1C; Future    5. Mixed hyperlipidemia  Mild, has preferred to avoid statin.  Recheck  - Lipid Profile; Future    Follow up: No follow-ups on file.         PLEASE NOTE: This note was created using voice recognition software.

## 2024-03-19 ENCOUNTER — HOSPITAL ENCOUNTER (OUTPATIENT)
Dept: LAB | Facility: MEDICAL CENTER | Age: 79
End: 2024-03-19
Attending: FAMILY MEDICINE
Payer: MEDICARE

## 2024-03-19 DIAGNOSIS — K22.2 SCHATZKI'S RING: ICD-10-CM

## 2024-03-19 DIAGNOSIS — R73.03 PREDIABETES: ICD-10-CM

## 2024-03-19 DIAGNOSIS — E78.2 MIXED HYPERLIPIDEMIA: ICD-10-CM

## 2024-03-19 LAB
ALBUMIN SERPL BCP-MCNC: 4.5 G/DL (ref 3.2–4.9)
ALBUMIN/GLOB SERPL: 1.8 G/DL
ALP SERPL-CCNC: 79 U/L (ref 30–99)
ALT SERPL-CCNC: 23 U/L (ref 2–50)
ANION GAP SERPL CALC-SCNC: 11 MMOL/L (ref 7–16)
AST SERPL-CCNC: 25 U/L (ref 12–45)
BASOPHILS # BLD AUTO: 1.4 % (ref 0–1.8)
BASOPHILS # BLD: 0.06 K/UL (ref 0–0.12)
BILIRUB SERPL-MCNC: 0.5 MG/DL (ref 0.1–1.5)
BUN SERPL-MCNC: 11 MG/DL (ref 8–22)
CALCIUM ALBUM COR SERPL-MCNC: 9 MG/DL (ref 8.5–10.5)
CALCIUM SERPL-MCNC: 9.4 MG/DL (ref 8.5–10.5)
CHLORIDE SERPL-SCNC: 104 MMOL/L (ref 96–112)
CHOLEST SERPL-MCNC: 195 MG/DL (ref 100–199)
CO2 SERPL-SCNC: 26 MMOL/L (ref 20–33)
CREAT SERPL-MCNC: 0.7 MG/DL (ref 0.5–1.4)
EOSINOPHIL # BLD AUTO: 0.2 K/UL (ref 0–0.51)
EOSINOPHIL NFR BLD: 4.6 % (ref 0–6.9)
ERYTHROCYTE [DISTWIDTH] IN BLOOD BY AUTOMATED COUNT: 42.3 FL (ref 35.9–50)
EST. AVERAGE GLUCOSE BLD GHB EST-MCNC: 120 MG/DL
FASTING STATUS PATIENT QL REPORTED: NORMAL
GFR SERPLBLD CREATININE-BSD FMLA CKD-EPI: 88 ML/MIN/1.73 M 2
GLOBULIN SER CALC-MCNC: 2.5 G/DL (ref 1.9–3.5)
GLUCOSE SERPL-MCNC: 90 MG/DL (ref 65–99)
HBA1C MFR BLD: 5.8 % (ref 4–5.6)
HCT VFR BLD AUTO: 45.6 % (ref 37–47)
HDLC SERPL-MCNC: 50 MG/DL
HGB BLD-MCNC: 14.9 G/DL (ref 12–16)
IMM GRANULOCYTES # BLD AUTO: 0.01 K/UL (ref 0–0.11)
IMM GRANULOCYTES NFR BLD AUTO: 0.2 % (ref 0–0.9)
LDLC SERPL CALC-MCNC: 126 MG/DL
LYMPHOCYTES # BLD AUTO: 1.83 K/UL (ref 1–4.8)
LYMPHOCYTES NFR BLD: 42.2 % (ref 22–41)
MCH RBC QN AUTO: 30.2 PG (ref 27–33)
MCHC RBC AUTO-ENTMCNC: 32.7 G/DL (ref 32.2–35.5)
MCV RBC AUTO: 92.5 FL (ref 81.4–97.8)
MONOCYTES # BLD AUTO: 0.56 K/UL (ref 0–0.85)
MONOCYTES NFR BLD AUTO: 12.9 % (ref 0–13.4)
NEUTROPHILS # BLD AUTO: 1.68 K/UL (ref 1.82–7.42)
NEUTROPHILS NFR BLD: 38.7 % (ref 44–72)
NRBC # BLD AUTO: 0 K/UL
NRBC BLD-RTO: 0 /100 WBC (ref 0–0.2)
PLATELET # BLD AUTO: 369 K/UL (ref 164–446)
PMV BLD AUTO: 9 FL (ref 9–12.9)
POTASSIUM SERPL-SCNC: 4.2 MMOL/L (ref 3.6–5.5)
PROT SERPL-MCNC: 7 G/DL (ref 6–8.2)
RBC # BLD AUTO: 4.93 M/UL (ref 4.2–5.4)
SODIUM SERPL-SCNC: 141 MMOL/L (ref 135–145)
TRIGL SERPL-MCNC: 93 MG/DL (ref 0–149)
WBC # BLD AUTO: 4.3 K/UL (ref 4.8–10.8)

## 2024-03-19 PROCEDURE — 85025 COMPLETE CBC W/AUTO DIFF WBC: CPT

## 2024-03-19 PROCEDURE — 83036 HEMOGLOBIN GLYCOSYLATED A1C: CPT | Mod: GA

## 2024-03-19 PROCEDURE — 80061 LIPID PANEL: CPT

## 2024-03-19 PROCEDURE — 36415 COLL VENOUS BLD VENIPUNCTURE: CPT | Mod: GA

## 2024-03-19 PROCEDURE — 80053 COMPREHEN METABOLIC PANEL: CPT

## 2024-06-14 ENCOUNTER — HOSPITAL ENCOUNTER (OUTPATIENT)
Dept: RADIOLOGY | Facility: MEDICAL CENTER | Age: 79
End: 2024-06-14
Attending: FAMILY MEDICINE
Payer: MEDICARE

## 2024-06-14 ENCOUNTER — HOSPITAL ENCOUNTER (OUTPATIENT)
Dept: RADIOLOGY | Facility: MEDICAL CENTER | Age: 79
End: 2024-06-14
Payer: MEDICARE

## 2024-06-14 DIAGNOSIS — S92.302A CLOSED AVULSION FRACTURE OF METATARSAL BONE OF LEFT FOOT, INITIAL ENCOUNTER: ICD-10-CM

## 2024-06-14 DIAGNOSIS — Z12.31 VISIT FOR SCREENING MAMMOGRAM: ICD-10-CM

## 2024-06-14 PROCEDURE — 77063 BREAST TOMOSYNTHESIS BI: CPT

## 2024-06-14 PROCEDURE — 73700 CT LOWER EXTREMITY W/O DYE: CPT | Mod: LT

## 2024-09-24 ENCOUNTER — PATIENT MESSAGE (OUTPATIENT)
Dept: MEDICAL GROUP | Facility: LAB | Age: 79
End: 2024-09-24
Payer: MEDICARE

## 2024-09-24 DIAGNOSIS — E78.2 MIXED HYPERLIPIDEMIA: ICD-10-CM

## 2024-09-24 DIAGNOSIS — R73.03 PREDIABETES: ICD-10-CM

## 2024-10-04 ENCOUNTER — HOSPITAL ENCOUNTER (OUTPATIENT)
Dept: LAB | Facility: MEDICAL CENTER | Age: 79
End: 2024-10-04
Attending: FAMILY MEDICINE
Payer: MEDICARE

## 2024-10-04 DIAGNOSIS — E78.2 MIXED HYPERLIPIDEMIA: ICD-10-CM

## 2024-10-04 DIAGNOSIS — R73.03 PREDIABETES: ICD-10-CM

## 2024-10-04 LAB
BASOPHILS # BLD AUTO: 1.7 % (ref 0–1.8)
BASOPHILS # BLD: 0.06 K/UL (ref 0–0.12)
CHOLEST SERPL-MCNC: 186 MG/DL (ref 100–199)
EOSINOPHIL # BLD AUTO: 0.14 K/UL (ref 0–0.51)
EOSINOPHIL NFR BLD: 3.9 % (ref 0–6.9)
ERYTHROCYTE [DISTWIDTH] IN BLOOD BY AUTOMATED COUNT: 47 FL (ref 35.9–50)
EST. AVERAGE GLUCOSE BLD GHB EST-MCNC: 111 MG/DL
HBA1C MFR BLD: 5.5 % (ref 4–5.6)
HCT VFR BLD AUTO: 44.2 % (ref 37–47)
HDLC SERPL-MCNC: 69 MG/DL
HGB BLD-MCNC: 14.6 G/DL (ref 12–16)
IMM GRANULOCYTES # BLD AUTO: 0.01 K/UL (ref 0–0.11)
IMM GRANULOCYTES NFR BLD AUTO: 0.3 % (ref 0–0.9)
LDLC SERPL CALC-MCNC: 106 MG/DL
LYMPHOCYTES # BLD AUTO: 1.45 K/UL (ref 1–4.8)
LYMPHOCYTES NFR BLD: 40.7 % (ref 22–41)
MCH RBC QN AUTO: 31.4 PG (ref 27–33)
MCHC RBC AUTO-ENTMCNC: 33 G/DL (ref 32.2–35.5)
MCV RBC AUTO: 95.1 FL (ref 81.4–97.8)
MONOCYTES # BLD AUTO: 0.51 K/UL (ref 0–0.85)
MONOCYTES NFR BLD AUTO: 14.3 % (ref 0–13.4)
NEUTROPHILS # BLD AUTO: 1.39 K/UL (ref 1.82–7.42)
NEUTROPHILS NFR BLD: 39.1 % (ref 44–72)
NRBC # BLD AUTO: 0 K/UL
NRBC BLD-RTO: 0 /100 WBC (ref 0–0.2)
PLATELET # BLD AUTO: 266 K/UL (ref 164–446)
PMV BLD AUTO: 9.6 FL (ref 9–12.9)
RBC # BLD AUTO: 4.65 M/UL (ref 4.2–5.4)
TRIGL SERPL-MCNC: 55 MG/DL (ref 0–149)
WBC # BLD AUTO: 3.6 K/UL (ref 4.8–10.8)

## 2024-10-04 PROCEDURE — 85025 COMPLETE CBC W/AUTO DIFF WBC: CPT

## 2024-10-04 PROCEDURE — 80061 LIPID PANEL: CPT

## 2024-10-04 PROCEDURE — 36415 COLL VENOUS BLD VENIPUNCTURE: CPT

## 2024-10-04 PROCEDURE — 83036 HEMOGLOBIN GLYCOSYLATED A1C: CPT | Mod: GA

## 2024-10-08 ENCOUNTER — OFFICE VISIT (OUTPATIENT)
Dept: MEDICAL GROUP | Facility: LAB | Age: 79
End: 2024-10-08
Payer: MEDICARE

## 2024-10-08 VITALS
HEIGHT: 66 IN | SYSTOLIC BLOOD PRESSURE: 98 MMHG | OXYGEN SATURATION: 98 % | WEIGHT: 118 LBS | DIASTOLIC BLOOD PRESSURE: 60 MMHG | BODY MASS INDEX: 18.96 KG/M2 | TEMPERATURE: 98.5 F | RESPIRATION RATE: 20 BRPM | HEART RATE: 72 BPM

## 2024-10-08 DIAGNOSIS — R73.03 PREDIABETES: ICD-10-CM

## 2024-10-08 DIAGNOSIS — E78.2 MIXED HYPERLIPIDEMIA: ICD-10-CM

## 2024-10-08 DIAGNOSIS — M81.0 AGE-RELATED OSTEOPOROSIS WITHOUT CURRENT PATHOLOGICAL FRACTURE: ICD-10-CM

## 2024-10-08 DIAGNOSIS — D70.9 NEUTROPENIA, UNSPECIFIED TYPE (HCC): ICD-10-CM

## 2024-10-08 PROCEDURE — 99214 OFFICE O/P EST MOD 30 MIN: CPT | Performed by: FAMILY MEDICINE

## 2024-10-08 PROCEDURE — 3074F SYST BP LT 130 MM HG: CPT | Performed by: FAMILY MEDICINE

## 2024-10-08 PROCEDURE — 3078F DIAST BP <80 MM HG: CPT | Performed by: FAMILY MEDICINE

## 2024-10-08 ASSESSMENT — PATIENT HEALTH QUESTIONNAIRE - PHQ9: CLINICAL INTERPRETATION OF PHQ2 SCORE: 0

## 2024-10-08 ASSESSMENT — FIBROSIS 4 INDEX: FIB4 SCORE: 1.57

## 2024-12-12 ENCOUNTER — HOSPITAL ENCOUNTER (OUTPATIENT)
Dept: RADIOLOGY | Facility: MEDICAL CENTER | Age: 79
End: 2024-12-12
Attending: OBSTETRICS & GYNECOLOGY
Payer: MEDICARE

## 2024-12-12 DIAGNOSIS — M81.0 SENILE OSTEOPOROSIS: ICD-10-CM

## 2024-12-12 PROCEDURE — 77080 DXA BONE DENSITY AXIAL: CPT

## 2025-01-06 ENCOUNTER — HOSPITAL ENCOUNTER (OUTPATIENT)
Dept: LAB | Facility: MEDICAL CENTER | Age: 80
End: 2025-01-06
Attending: FAMILY MEDICINE
Payer: MEDICARE

## 2025-01-06 DIAGNOSIS — D70.9 NEUTROPENIA, UNSPECIFIED TYPE (HCC): ICD-10-CM

## 2025-01-06 LAB
BASOPHILS # BLD AUTO: 1.4 % (ref 0–1.8)
BASOPHILS # BLD: 0.07 K/UL (ref 0–0.12)
EOSINOPHIL # BLD AUTO: 0.09 K/UL (ref 0–0.51)
EOSINOPHIL NFR BLD: 1.9 % (ref 0–6.9)
ERYTHROCYTE [DISTWIDTH] IN BLOOD BY AUTOMATED COUNT: 44.3 FL (ref 35.9–50)
HCT VFR BLD AUTO: 44.9 % (ref 37–47)
HGB BLD-MCNC: 14.9 G/DL (ref 12–16)
IMM GRANULOCYTES # BLD AUTO: 0.01 K/UL (ref 0–0.11)
IMM GRANULOCYTES NFR BLD AUTO: 0.2 % (ref 0–0.9)
LYMPHOCYTES # BLD AUTO: 1.61 K/UL (ref 1–4.8)
LYMPHOCYTES NFR BLD: 33.1 % (ref 22–41)
MCH RBC QN AUTO: 31.3 PG (ref 27–33)
MCHC RBC AUTO-ENTMCNC: 33.2 G/DL (ref 32.2–35.5)
MCV RBC AUTO: 94.3 FL (ref 81.4–97.8)
MONOCYTES # BLD AUTO: 0.63 K/UL (ref 0–0.85)
MONOCYTES NFR BLD AUTO: 13 % (ref 0–13.4)
NEUTROPHILS # BLD AUTO: 2.45 K/UL (ref 1.82–7.42)
NEUTROPHILS NFR BLD: 50.4 % (ref 44–72)
NRBC # BLD AUTO: 0 K/UL
NRBC BLD-RTO: 0 /100 WBC (ref 0–0.2)
PLATELET # BLD AUTO: 270 K/UL (ref 164–446)
PMV BLD AUTO: 10.2 FL (ref 9–12.9)
RBC # BLD AUTO: 4.76 M/UL (ref 4.2–5.4)
WBC # BLD AUTO: 4.9 K/UL (ref 4.8–10.8)

## 2025-01-06 PROCEDURE — 36415 COLL VENOUS BLD VENIPUNCTURE: CPT

## 2025-01-06 PROCEDURE — 85025 COMPLETE CBC W/AUTO DIFF WBC: CPT

## 2025-07-08 ENCOUNTER — HOSPITAL ENCOUNTER (OUTPATIENT)
Dept: RADIOLOGY | Facility: MEDICAL CENTER | Age: 80
End: 2025-07-08
Attending: OBSTETRICS & GYNECOLOGY
Payer: MEDICARE

## 2025-07-08 DIAGNOSIS — Z12.31 ENCOUNTER FOR SCREENING MAMMOGRAM FOR MALIGNANT NEOPLASM OF BREAST: ICD-10-CM

## 2025-07-08 PROCEDURE — 77063 BREAST TOMOSYNTHESIS BI: CPT

## 2025-08-26 ENCOUNTER — APPOINTMENT (OUTPATIENT)
Dept: MEDICAL GROUP | Facility: LAB | Age: 80
End: 2025-08-26
Payer: MEDICARE

## 2025-08-26 ASSESSMENT — FIBROSIS 4 INDEX: FIB4 SCORE: 1.56
